# Patient Record
Sex: FEMALE | Race: BLACK OR AFRICAN AMERICAN | NOT HISPANIC OR LATINO | Employment: OTHER | URBAN - METROPOLITAN AREA
[De-identification: names, ages, dates, MRNs, and addresses within clinical notes are randomized per-mention and may not be internally consistent; named-entity substitution may affect disease eponyms.]

---

## 2017-01-03 ENCOUNTER — APPOINTMENT (OUTPATIENT)
Dept: PHYSICAL THERAPY | Facility: CLINIC | Age: 72
End: 2017-01-03
Payer: COMMERCIAL

## 2017-01-03 PROCEDURE — 97140 MANUAL THERAPY 1/> REGIONS: CPT

## 2017-01-03 PROCEDURE — 97110 THERAPEUTIC EXERCISES: CPT

## 2017-01-03 PROCEDURE — 97112 NEUROMUSCULAR REEDUCATION: CPT

## 2017-01-05 ENCOUNTER — APPOINTMENT (OUTPATIENT)
Dept: PHYSICAL THERAPY | Facility: CLINIC | Age: 72
End: 2017-01-05
Payer: COMMERCIAL

## 2017-01-05 PROCEDURE — 97140 MANUAL THERAPY 1/> REGIONS: CPT

## 2017-01-05 PROCEDURE — 97110 THERAPEUTIC EXERCISES: CPT

## 2017-01-06 ENCOUNTER — APPOINTMENT (OUTPATIENT)
Dept: PHYSICAL THERAPY | Facility: CLINIC | Age: 72
End: 2017-01-06
Payer: COMMERCIAL

## 2017-01-06 PROCEDURE — 97112 NEUROMUSCULAR REEDUCATION: CPT

## 2017-01-06 PROCEDURE — 97140 MANUAL THERAPY 1/> REGIONS: CPT

## 2017-01-06 PROCEDURE — 97110 THERAPEUTIC EXERCISES: CPT

## 2017-01-09 ENCOUNTER — APPOINTMENT (OUTPATIENT)
Dept: PHYSICAL THERAPY | Facility: CLINIC | Age: 72
End: 2017-01-09
Payer: COMMERCIAL

## 2017-01-10 ENCOUNTER — APPOINTMENT (OUTPATIENT)
Dept: PHYSICAL THERAPY | Facility: CLINIC | Age: 72
End: 2017-01-10
Payer: COMMERCIAL

## 2017-01-10 PROCEDURE — 97112 NEUROMUSCULAR REEDUCATION: CPT

## 2017-01-10 PROCEDURE — 97140 MANUAL THERAPY 1/> REGIONS: CPT

## 2017-01-11 ENCOUNTER — APPOINTMENT (OUTPATIENT)
Dept: PHYSICAL THERAPY | Facility: CLINIC | Age: 72
End: 2017-01-11
Payer: COMMERCIAL

## 2017-01-12 ENCOUNTER — APPOINTMENT (OUTPATIENT)
Dept: PHYSICAL THERAPY | Facility: CLINIC | Age: 72
End: 2017-01-12
Payer: COMMERCIAL

## 2017-01-12 PROCEDURE — 97110 THERAPEUTIC EXERCISES: CPT

## 2017-01-12 PROCEDURE — 97140 MANUAL THERAPY 1/> REGIONS: CPT

## 2017-01-16 ENCOUNTER — APPOINTMENT (OUTPATIENT)
Dept: PHYSICAL THERAPY | Facility: CLINIC | Age: 72
End: 2017-01-16
Payer: COMMERCIAL

## 2017-01-18 ENCOUNTER — APPOINTMENT (OUTPATIENT)
Dept: PHYSICAL THERAPY | Facility: CLINIC | Age: 72
End: 2017-01-18
Payer: COMMERCIAL

## 2017-01-23 ENCOUNTER — APPOINTMENT (OUTPATIENT)
Dept: PHYSICAL THERAPY | Facility: CLINIC | Age: 72
End: 2017-01-23
Payer: COMMERCIAL

## 2017-01-25 ENCOUNTER — APPOINTMENT (OUTPATIENT)
Dept: PHYSICAL THERAPY | Facility: CLINIC | Age: 72
End: 2017-01-25
Payer: COMMERCIAL

## 2017-01-26 ENCOUNTER — APPOINTMENT (OUTPATIENT)
Dept: PHYSICAL THERAPY | Facility: CLINIC | Age: 72
End: 2017-01-26
Payer: COMMERCIAL

## 2017-01-26 PROCEDURE — 97140 MANUAL THERAPY 1/> REGIONS: CPT

## 2017-01-26 PROCEDURE — 97110 THERAPEUTIC EXERCISES: CPT

## 2017-01-26 PROCEDURE — 97112 NEUROMUSCULAR REEDUCATION: CPT

## 2017-01-30 ENCOUNTER — APPOINTMENT (OUTPATIENT)
Dept: PHYSICAL THERAPY | Facility: CLINIC | Age: 72
End: 2017-01-30
Payer: COMMERCIAL

## 2017-02-01 ENCOUNTER — APPOINTMENT (OUTPATIENT)
Dept: PHYSICAL THERAPY | Facility: CLINIC | Age: 72
End: 2017-02-01
Payer: COMMERCIAL

## 2017-02-08 ENCOUNTER — APPOINTMENT (OUTPATIENT)
Dept: PHYSICAL THERAPY | Facility: CLINIC | Age: 72
End: 2017-02-08
Payer: COMMERCIAL

## 2017-02-08 ENCOUNTER — GENERIC CONVERSION - ENCOUNTER (OUTPATIENT)
Dept: OTHER | Facility: OTHER | Age: 72
End: 2017-02-08

## 2017-02-08 PROCEDURE — G8990 OTHER PT/OT CURRENT STATUS: HCPCS

## 2017-02-08 PROCEDURE — G8991 OTHER PT/OT GOAL STATUS: HCPCS

## 2017-02-08 PROCEDURE — 97110 THERAPEUTIC EXERCISES: CPT

## 2017-02-08 PROCEDURE — 97112 NEUROMUSCULAR REEDUCATION: CPT

## 2017-02-13 ENCOUNTER — APPOINTMENT (OUTPATIENT)
Dept: PHYSICAL THERAPY | Facility: CLINIC | Age: 72
End: 2017-02-13
Payer: COMMERCIAL

## 2017-02-15 ENCOUNTER — APPOINTMENT (OUTPATIENT)
Dept: PHYSICAL THERAPY | Facility: CLINIC | Age: 72
End: 2017-02-15
Payer: COMMERCIAL

## 2017-02-15 PROCEDURE — 97110 THERAPEUTIC EXERCISES: CPT

## 2017-02-22 ENCOUNTER — APPOINTMENT (OUTPATIENT)
Dept: PHYSICAL THERAPY | Facility: CLINIC | Age: 72
End: 2017-02-22
Payer: COMMERCIAL

## 2017-02-28 ENCOUNTER — ALLSCRIPTS OFFICE VISIT (OUTPATIENT)
Dept: OTHER | Facility: OTHER | Age: 72
End: 2017-02-28

## 2017-02-28 DIAGNOSIS — Z12.39 ENCOUNTER FOR OTHER SCREENING FOR MALIGNANT NEOPLASM OF BREAST: ICD-10-CM

## 2017-03-08 ENCOUNTER — APPOINTMENT (OUTPATIENT)
Dept: PHYSICAL THERAPY | Facility: CLINIC | Age: 72
End: 2017-03-08
Payer: COMMERCIAL

## 2017-03-08 PROCEDURE — 97110 THERAPEUTIC EXERCISES: CPT

## 2017-03-08 PROCEDURE — 97112 NEUROMUSCULAR REEDUCATION: CPT

## 2017-03-08 PROCEDURE — 97140 MANUAL THERAPY 1/> REGIONS: CPT

## 2017-03-16 ENCOUNTER — APPOINTMENT (OUTPATIENT)
Dept: PHYSICAL THERAPY | Facility: CLINIC | Age: 72
End: 2017-03-16
Payer: COMMERCIAL

## 2017-03-17 ENCOUNTER — APPOINTMENT (OUTPATIENT)
Dept: PHYSICAL THERAPY | Facility: CLINIC | Age: 72
End: 2017-03-17
Payer: COMMERCIAL

## 2017-03-17 ENCOUNTER — GENERIC CONVERSION - ENCOUNTER (OUTPATIENT)
Dept: OTHER | Facility: OTHER | Age: 72
End: 2017-03-17

## 2017-03-17 PROCEDURE — G8991 OTHER PT/OT GOAL STATUS: HCPCS

## 2017-03-17 PROCEDURE — 97110 THERAPEUTIC EXERCISES: CPT

## 2017-04-10 ENCOUNTER — ALLSCRIPTS OFFICE VISIT (OUTPATIENT)
Dept: OTHER | Facility: OTHER | Age: 72
End: 2017-04-10

## 2017-04-25 ENCOUNTER — ALLSCRIPTS OFFICE VISIT (OUTPATIENT)
Dept: OTHER | Facility: OTHER | Age: 72
End: 2017-04-25

## 2017-04-25 DIAGNOSIS — Z78.0 ASYMPTOMATIC MENOPAUSAL STATE: ICD-10-CM

## 2017-04-25 DIAGNOSIS — Z12.39 ENCOUNTER FOR OTHER SCREENING FOR MALIGNANT NEOPLASM OF BREAST: ICD-10-CM

## 2017-04-25 DIAGNOSIS — N95.1 FEMALE CLIMACTERIC STATE: ICD-10-CM

## 2017-04-28 ENCOUNTER — HOSPITAL ENCOUNTER (OUTPATIENT)
Dept: MAMMOGRAPHY | Facility: HOSPITAL | Age: 72
Discharge: HOME | End: 2017-04-28
Attending: OBSTETRICS & GYNECOLOGY
Payer: COMMERCIAL

## 2017-04-28 DIAGNOSIS — Z12.39 ENCOUNTER FOR OTHER SCREENING FOR MALIGNANT NEOPLASM OF BREAST: ICD-10-CM

## 2017-04-28 PROCEDURE — G0202 SCR MAMMO BI INCL CAD: HCPCS

## 2017-05-03 ENCOUNTER — HOSPITAL ENCOUNTER (OUTPATIENT)
Dept: RADIOLOGY | Age: 72
Discharge: HOME | End: 2017-05-03
Payer: COMMERCIAL

## 2017-05-03 DIAGNOSIS — N95.1 FEMALE CLIMACTERIC STATE: ICD-10-CM

## 2017-05-03 DIAGNOSIS — Z78.0 ASYMPTOMATIC MENOPAUSAL STATE: ICD-10-CM

## 2017-05-03 PROCEDURE — 77080 DXA BONE DENSITY AXIAL: CPT

## 2017-05-05 ENCOUNTER — GENERIC CONVERSION - ENCOUNTER (OUTPATIENT)
Dept: OTHER | Facility: OTHER | Age: 72
End: 2017-05-05

## 2017-06-27 ENCOUNTER — ALLSCRIPTS OFFICE VISIT (OUTPATIENT)
Dept: OTHER | Facility: OTHER | Age: 72
End: 2017-06-27

## 2017-06-27 DIAGNOSIS — M54.6 PAIN IN THORACIC SPINE: ICD-10-CM

## 2017-06-27 DIAGNOSIS — I10 ESSENTIAL (PRIMARY) HYPERTENSION: ICD-10-CM

## 2017-06-27 DIAGNOSIS — R60.9 EDEMA: ICD-10-CM

## 2017-06-29 ENCOUNTER — GENERIC CONVERSION - ENCOUNTER (OUTPATIENT)
Dept: OTHER | Facility: OTHER | Age: 72
End: 2017-06-29

## 2017-06-29 ENCOUNTER — APPOINTMENT (OUTPATIENT)
Dept: LAB | Facility: CLINIC | Age: 72
End: 2017-06-29
Payer: COMMERCIAL

## 2017-06-29 DIAGNOSIS — I10 ESSENTIAL (PRIMARY) HYPERTENSION: ICD-10-CM

## 2017-06-29 DIAGNOSIS — R60.9 EDEMA: ICD-10-CM

## 2017-06-29 DIAGNOSIS — M54.6 PAIN IN THORACIC SPINE: ICD-10-CM

## 2017-06-29 LAB
ALBUMIN SERPL BCP-MCNC: 4 G/DL (ref 3.5–5)
ALP SERPL-CCNC: 100 U/L (ref 46–116)
ALT SERPL W P-5'-P-CCNC: 24 U/L (ref 12–78)
ANION GAP SERPL CALCULATED.3IONS-SCNC: 7 MMOL/L (ref 4–13)
AST SERPL W P-5'-P-CCNC: 17 U/L (ref 5–45)
BILIRUB SERPL-MCNC: 0.42 MG/DL (ref 0.2–1)
BUN SERPL-MCNC: 19 MG/DL (ref 5–25)
CALCIUM SERPL-MCNC: 9.3 MG/DL (ref 8.3–10.1)
CHLORIDE SERPL-SCNC: 104 MMOL/L (ref 100–108)
CHOLEST SERPL-MCNC: 197 MG/DL (ref 50–200)
CO2 SERPL-SCNC: 29 MMOL/L (ref 21–32)
CREAT SERPL-MCNC: 0.75 MG/DL (ref 0.6–1.3)
ERYTHROCYTE [DISTWIDTH] IN BLOOD BY AUTOMATED COUNT: 14.9 % (ref 11.6–15.1)
GFR SERPL CREATININE-BSD FRML MDRD: >60 ML/MIN/1.73SQ M
GLUCOSE P FAST SERPL-MCNC: 104 MG/DL (ref 65–99)
HCT VFR BLD AUTO: 39 % (ref 34.8–46.1)
HDLC SERPL-MCNC: 62 MG/DL (ref 40–60)
HGB BLD-MCNC: 12.6 G/DL (ref 11.5–15.4)
LDLC SERPL CALC-MCNC: 113 MG/DL (ref 0–100)
MCH RBC QN AUTO: 28.1 PG (ref 26.8–34.3)
MCHC RBC AUTO-ENTMCNC: 32.3 G/DL (ref 31.4–37.4)
MCV RBC AUTO: 87 FL (ref 82–98)
PLATELET # BLD AUTO: 227 THOUSANDS/UL (ref 149–390)
PMV BLD AUTO: 10.3 FL (ref 8.9–12.7)
POTASSIUM SERPL-SCNC: 3.5 MMOL/L (ref 3.5–5.3)
PROT SERPL-MCNC: 7.6 G/DL (ref 6.4–8.2)
RBC # BLD AUTO: 4.48 MILLION/UL (ref 3.81–5.12)
SODIUM SERPL-SCNC: 140 MMOL/L (ref 136–145)
TRIGL SERPL-MCNC: 112 MG/DL
TSH SERPL DL<=0.05 MIU/L-ACNC: 1.35 UIU/ML (ref 0.36–3.74)
WBC # BLD AUTO: 4.46 THOUSAND/UL (ref 4.31–10.16)

## 2017-06-29 PROCEDURE — 84443 ASSAY THYROID STIM HORMONE: CPT

## 2017-06-29 PROCEDURE — 85027 COMPLETE CBC AUTOMATED: CPT

## 2017-06-29 PROCEDURE — 80053 COMPREHEN METABOLIC PANEL: CPT

## 2017-06-29 PROCEDURE — 80061 LIPID PANEL: CPT

## 2017-06-30 ENCOUNTER — GENERIC CONVERSION - ENCOUNTER (OUTPATIENT)
Dept: OTHER | Facility: OTHER | Age: 72
End: 2017-06-30

## 2017-07-28 ENCOUNTER — TRANSCRIBE ORDERS (OUTPATIENT)
Dept: ADMINISTRATIVE | Facility: HOSPITAL | Age: 72
End: 2017-07-28

## 2017-07-28 DIAGNOSIS — M25.473 SWELLING OF ANKLE JOINT, UNSPECIFIED LATERALITY: Primary | ICD-10-CM

## 2017-07-31 ENCOUNTER — GENERIC CONVERSION - ENCOUNTER (OUTPATIENT)
Dept: OTHER | Facility: OTHER | Age: 72
End: 2017-07-31

## 2017-07-31 ENCOUNTER — HOSPITAL ENCOUNTER (OUTPATIENT)
Dept: NON INVASIVE DIAGNOSTICS | Facility: CLINIC | Age: 72
Discharge: HOME | End: 2017-07-31
Payer: COMMERCIAL

## 2017-07-31 DIAGNOSIS — M25.473 SWELLING OF ANKLE JOINT, UNSPECIFIED LATERALITY: ICD-10-CM

## 2017-07-31 PROCEDURE — 93306 TTE W/DOPPLER COMPLETE: CPT

## 2017-08-08 ENCOUNTER — HOSPITAL ENCOUNTER (OUTPATIENT)
Dept: NON INVASIVE DIAGNOSTICS | Facility: CLINIC | Age: 72
Discharge: HOME | End: 2017-08-08
Payer: COMMERCIAL

## 2017-09-14 ENCOUNTER — GENERIC CONVERSION - ENCOUNTER (OUTPATIENT)
Dept: OTHER | Facility: OTHER | Age: 72
End: 2017-09-14

## 2017-09-14 DIAGNOSIS — M25.561 PAIN IN RIGHT KNEE: ICD-10-CM

## 2017-11-26 ENCOUNTER — HOSPITAL ENCOUNTER (EMERGENCY)
Facility: HOSPITAL | Age: 72
Discharge: HOME | End: 2017-11-26
Attending: EMERGENCY MEDICINE
Payer: COMMERCIAL

## 2017-11-26 VITALS
OXYGEN SATURATION: 100 % | HEIGHT: 61 IN | HEART RATE: 59 BPM | DIASTOLIC BLOOD PRESSURE: 107 MMHG | TEMPERATURE: 98.1 F | WEIGHT: 187.17 LBS | RESPIRATION RATE: 18 BRPM | SYSTOLIC BLOOD PRESSURE: 178 MMHG | BODY MASS INDEX: 35.34 KG/M2

## 2017-11-26 DIAGNOSIS — M54.31 SCIATICA OF RIGHT SIDE: Primary | ICD-10-CM

## 2017-11-26 PROCEDURE — 96372 THER/PROPH/DIAG INJ SC/IM: CPT

## 2017-11-26 PROCEDURE — 99283 EMERGENCY DEPT VISIT LOW MDM: CPT

## 2017-11-26 RX ORDER — DEXAMETHASONE SODIUM PHOSPHATE 10 MG/ML
10 INJECTION, SOLUTION INTRAMUSCULAR; INTRAVENOUS ONCE
Status: COMPLETED | OUTPATIENT
Start: 2017-11-26 | End: 2017-11-26

## 2017-11-26 RX ORDER — METHYLPREDNISOLONE 4 MG/1
TABLET ORAL
Qty: 21 TABLET | Refills: 0 | Status: SHIPPED | OUTPATIENT
Start: 2017-11-27 | End: 2020-08-24 | Stop reason: ALTCHOICE

## 2017-11-26 RX ADMIN — DEXAMETHASONE SODIUM PHOSPHATE 10 MG: 10 INJECTION, SOLUTION INTRAMUSCULAR; INTRAVENOUS at 22:37

## 2017-11-27 NOTE — ED PROVIDER NOTES
History  Chief Complaint   Patient presents with    Leg Pain     Pt presents to ED for evaluation and treatment of right lateral leg/hip pain worsenng over past 3 days  No known injury     Patient is a 67year old female with several days of worsening right buttock pain with radiation down to right lower leg  No trauma  No fever  No weakness or numbness  No urinary sx or incontinence  No weight loss  Tried ibuprofen and aleve without relief  Does not want narcotic pain medication  Was last seen in this ED on 7/14/16 for shoulder dislocation  [facility] website checked on this patient and last Rx filled was on 3/30/17 for vicodin  Has appointment with Dr Damien Hernandez on 12/5/17  History provided by:  Patient and relative (daughter)   used: No    Leg Pain   Associated symptoms: back pain    Associated symptoms: no fever        Prior to Admission Medications   Prescriptions Last Dose Informant Patient Reported? Taking?   hydrochlorothiazide (HYDRODIURIL) 25 mg tablet   Yes No   Sig: Take 25 mg by mouth daily  lisinopril (ZESTRIL) 10 mg tablet   Yes No   Sig: Take 10 mg by mouth daily  metoprolol succinate (TOPROL-XL) 50 mg 24 hr tablet   Yes No   Sig: Take 50 mg by mouth 2 (two) times a day  naproxen sodium (ANAPROX) 550 mg tablet   No No   Sig: Take 1 tablet (550 mg total) by mouth 2 (two) times a day with meals        Facility-Administered Medications: None       Past Medical History:   Diagnosis Date    Edema     B/L LE    Heart murmur     Hypertension     Lower leg pain     left    PONV (postoperative nausea and vomiting)     Rotator cuff tear     left    Seasonal allergies     Shoulder dislocation     Right x5    Varicose vein of leg     B/L       Past Surgical History:   Procedure Laterality Date    BREAST BIOPSY      COLONOSCOPY      KNEE ARTHROSCOPY Left     NO PAST SURGERIES      [place] SHLDR ARTHROSCOP,SURG,W/ROTAT CUFF REPR Right 8/24/2016    Procedure: SHOULDER ARTHROSCOPY WITH REPAIR ROTATOR CUFF  ;  Surgeon: Yojana Rascon MD;  [facility];  Service: Orthopedics    TUBAL LIGATION         No family history on file  I have reviewed and agree with the history as documented  Social History   Substance Use Topics    Smoking status: Never Smoker    Smokeless tobacco: Never Used    Alcohol use Yes      Comment: socially        Review of Systems   Constitutional: Negative for fever and unexpected weight change  Genitourinary: Negative for difficulty urinating  Musculoskeletal: Positive for arthralgias and back pain  Neurological: Negative for weakness and numbness  All other systems reviewed and are negative  Physical Exam  ED Triage Vitals [11/26/17 2106]   Temperature Pulse Respirations Blood Pressure SpO2   98 1 °F (36 7 °C) 59 18 (!) 178/107 100 %      Temp Source Heart Rate Source Patient Position - Orthostatic VS BP Location FiO2 (%)   Oral Monitor Lying Right arm --      Pain Score       9           Orthostatic Vital Signs  Vitals:    11/26/17 2106   BP: (!) 178/107   Pulse: 59   Patient Position - Orthostatic VS: Lying       Physical Exam   Constitutional: She is oriented to person, place, and time  She appears well-developed and well-nourished  She appears distressed (moderate)  HENT:   Head: Normocephalic and atraumatic  Moist mucous membranes  Eyes: No scleral icterus  Neck: No JVD present  Cardiovascular: Regular rhythm and normal heart sounds  No murmur heard  Bradycardia  Pulmonary/Chest: Effort normal and breath sounds normal  No stridor  No respiratory distress  Abdominal: Soft  Bowel sounds are normal  There is no tenderness  Musculoskeletal: She exhibits tenderness (R gluteal tenderness with 30 degree straight leg raise  NVI  )  She exhibits no edema or deformity  Neurological: She is alert and oriented to person, place, and time  No focal deficits  Skin: Skin is warm and dry  No rash noted  Psychiatric: She has a normal mood and affect  Nursing note and vitals reviewed  ED Medications  Medications   dexamethasone (PF) (DECADRON) injection 10 mg (not administered)       Diagnostic Studies  Results Reviewed     None                 No orders to display              Procedures  Procedures       Phone Contacts  ED Phone Contact    ED Course  ED Course                                MDM  Number of Diagnoses or Management Options  Diagnosis management comments: DDx including but not limited to: sciatica, herniated disc, arthritis, spinal stenosis, strain, sprain; doubt fracture, cauda equina syndrome, epidural abscess, AAA  Amount and/or Complexity of Data Reviewed  Decide to obtain previous medical records or to obtain history from someone other than the patient: yes  Obtain history from someone other than the patient: yes  Review and summarize past medical records: yes      CritCare Time    Disposition  Final diagnoses:   Sciatica of right side     Time reflects when diagnosis was documented in both MDM as applicable and the Disposition within this note     Time User Action Codes Description Comment    11/26/2017 10:21 PM Erlinda Bland Add [M54 31] Sciatica of right side       ED Disposition     ED Disposition Condition Comment    Discharge  Ab Po discharge to home/self care  Condition at discharge: Stable        Follow-up Information     Follow up With Specialties Details Why [facility]  Call in 3 days Continue ibuprofen or aleve for pain  Return sooner if increased pain, fever, rash, vomiting, weakness, numbness, incontinence, difficulty urinating    [address]  [phone]        Patient's Medications   Discharge Prescriptions    METHYLPREDNISOLONE 4 MG TBPK    Use as directed on package       Start Date: 11/27/2017End Date: --       Order Dose: --       Quantity: 21 tablet Refills: 0     No discharge procedures on file      ED Provider  Electronically Signed by           John Ramsey MD  11/26/17 3119

## 2017-11-27 NOTE — DISCHARGE INSTRUCTIONS
Sciatica   WHAT YOU NEED TO KNOW:   Sciatica is a condition that causes pain along your sciatic nerve  The sciatic nerve runs from your spine through both sides of your buttocks  It then runs down the back of your thigh, into your lower leg and foot  Your sciatic nerve may be compressed, inflamed, irritated, or stretched  DISCHARGE INSTRUCTIONS:   Medicines:   · NSAIDs:  These medicines decrease swelling and pain  NSAIDs are available without a doctor's order  Ask your healthcare provider which medicine is right for you  Ask how much to take and when to take it  Take as directed  NSAIDs can cause stomach bleeding or kidney problems if not taken correctly  · Acetaminophen: This medicine decreases pain  Acetaminophen is available without a doctor's order  Ask how much to take and when to take it  Follow directions  Acetaminophen can cause liver damage if not taken correctly  · Muscle relaxers  help decrease pain and muscle spasms  · Take your medicine as directed  Contact your healthcare provider if you think your medicine is not helping or if you have side effects  Tell him of her if you are allergic to any medicine  Keep a list of the medicines, vitamins, and herbs you take  Include the amounts, and when and why you take them  Bring the list or the pill bottles to follow-up visits  Carry your medicine list with you in case of an emergency  Follow up with your healthcare provider as directed:  Write down your questions so you remember to ask them during your visits  Manage your symptoms:   · Activity:  Decrease your activity  Do not lift heavy objects or twist your back for at least 6 weeks  Slowly return to your usual activity  · Ice:  Ice helps decrease swelling and pain  Ice may also help prevent tissue damage  Use an ice pack, or put crushed ice in a plastic bag  Cover it with a towel and place it on your low back or leg for 15 to 20 minutes every hour or as directed      · Heat:  Heat helps decrease pain and muscle spasms  Apply heat on the area for 20 to 30 minutes every 2 hours for as many days as directed  · Physical therapy:  You may need to see physical therapist to teach you exercises to help improve movement and strength, and to decrease pain  An occupational therapist teaches you skills to help with your daily activities  · Use assistive devices if directed: You may need to wear back support, such as a back brace  You may need crutches, a cane, or a walker to decrease stress on your lower back and leg muscles  Ask your healthcare provider for more information about assistive devices and how to use them correctly  Self-care:   · Avoid pressure on your back and legs:  Do not  lift heavy objects, or stand or sit for long periods of time  · Lift objects safely:  Keep your back straight and bend your knees when you  an object  Do not bend or twist your back when you lift  · Maintain a healthy weight:  Ask your healthcare provider how much you should weigh  Ask him to help you create a weight loss plan if you are overweight  · Exercise:  Ask your healthcare provider about the best stretching, warmup, and exercise plan for you  Contact your healthcare provider if:   · You have pain in your lower back at night or when resting  · You have pain in your lower back with numbness below the knee  · You have weakness in one leg only  · You have questions or concerns about your condition or care  Return to the emergency department if:   · You have trouble holding back your urine or bowel movements  · You have weakness in both legs  · You have numbness in your groin or buttocks  © 2017 [address] [employer] Information is for End User's use only and may not be sold, redistributed or otherwise used for commercial purposes  All illustrations and images included in CareNotes® are the copyrighted property of [employer]  or Saul Malin    The above information is an  only  It is not intended as medical advice for individual conditions or treatments  Talk to your doctor, nurse or pharmacist before following any medical regimen to see if it is safe and effective for you

## 2017-11-27 NOTE — ED NOTES
Pt  Transported out of dept  In wheelchair by family member, Mollie Saint, no acute distress       Kenneth Levine RN  11/26/17 3566

## 2017-11-29 ENCOUNTER — GENERIC CONVERSION - ENCOUNTER (OUTPATIENT)
Dept: OTHER | Facility: OTHER | Age: 72
End: 2017-11-29

## 2017-11-29 DIAGNOSIS — I10 ESSENTIAL (PRIMARY) HYPERTENSION: ICD-10-CM

## 2017-11-29 DIAGNOSIS — R73.01 IMPAIRED FASTING GLUCOSE: ICD-10-CM

## 2017-11-29 DIAGNOSIS — R60.9 EDEMA: ICD-10-CM

## 2017-12-01 ENCOUNTER — APPOINTMENT (OUTPATIENT)
Dept: LAB | Facility: CLINIC | Age: 72
End: 2017-12-01
Payer: COMMERCIAL

## 2017-12-01 DIAGNOSIS — R60.9 EDEMA: ICD-10-CM

## 2017-12-01 DIAGNOSIS — R73.01 IMPAIRED FASTING GLUCOSE: ICD-10-CM

## 2017-12-01 DIAGNOSIS — I10 ESSENTIAL (PRIMARY) HYPERTENSION: ICD-10-CM

## 2017-12-01 LAB
ALBUMIN SERPL BCP-MCNC: 3.7 G/DL (ref 3.5–5)
ALP SERPL-CCNC: 104 U/L (ref 46–116)
ALT SERPL W P-5'-P-CCNC: 21 U/L (ref 12–78)
ANION GAP SERPL CALCULATED.3IONS-SCNC: 5 MMOL/L (ref 4–13)
AST SERPL W P-5'-P-CCNC: 10 U/L (ref 5–45)
BILIRUB SERPL-MCNC: 0.44 MG/DL (ref 0.2–1)
BUN SERPL-MCNC: 21 MG/DL (ref 5–25)
CALCIUM SERPL-MCNC: 9.4 MG/DL (ref 8.3–10.1)
CHLORIDE SERPL-SCNC: 102 MMOL/L (ref 100–108)
CHOLEST SERPL-MCNC: 222 MG/DL (ref 50–200)
CO2 SERPL-SCNC: 29 MMOL/L (ref 21–32)
CREAT SERPL-MCNC: 0.86 MG/DL (ref 0.6–1.3)
ERYTHROCYTE [DISTWIDTH] IN BLOOD BY AUTOMATED COUNT: 14.3 % (ref 11.6–15.1)
EST. AVERAGE GLUCOSE BLD GHB EST-MCNC: 131 MG/DL
GFR SERPL CREATININE-BSD FRML MDRD: 78 ML/MIN/1.73SQ M
GLUCOSE P FAST SERPL-MCNC: 105 MG/DL (ref 65–99)
HBA1C MFR BLD: 6.2 % (ref 4.2–6.3)
HCT VFR BLD AUTO: 41.3 % (ref 34.8–46.1)
HDLC SERPL-MCNC: 78 MG/DL (ref 40–60)
HGB BLD-MCNC: 13.7 G/DL (ref 11.5–15.4)
LDLC SERPL CALC-MCNC: 128 MG/DL (ref 0–100)
MCH RBC QN AUTO: 27.8 PG (ref 26.8–34.3)
MCHC RBC AUTO-ENTMCNC: 33.2 G/DL (ref 31.4–37.4)
MCV RBC AUTO: 84 FL (ref 82–98)
PLATELET # BLD AUTO: 255 THOUSANDS/UL (ref 149–390)
PMV BLD AUTO: 10.9 FL (ref 8.9–12.7)
POTASSIUM SERPL-SCNC: 3.6 MMOL/L (ref 3.5–5.3)
PROT SERPL-MCNC: 7.8 G/DL (ref 6.4–8.2)
RBC # BLD AUTO: 4.93 MILLION/UL (ref 3.81–5.12)
SODIUM SERPL-SCNC: 136 MMOL/L (ref 136–145)
TRIGL SERPL-MCNC: 80 MG/DL
TSH SERPL DL<=0.05 MIU/L-ACNC: 0.74 UIU/ML (ref 0.36–3.74)
WBC # BLD AUTO: 6.56 THOUSAND/UL (ref 4.31–10.16)

## 2017-12-01 PROCEDURE — 85027 COMPLETE CBC AUTOMATED: CPT

## 2017-12-01 PROCEDURE — 83036 HEMOGLOBIN GLYCOSYLATED A1C: CPT

## 2017-12-01 PROCEDURE — 36415 COLL VENOUS BLD VENIPUNCTURE: CPT

## 2017-12-01 PROCEDURE — 80053 COMPREHEN METABOLIC PANEL: CPT

## 2017-12-01 PROCEDURE — 84443 ASSAY THYROID STIM HORMONE: CPT

## 2017-12-01 PROCEDURE — 80061 LIPID PANEL: CPT

## 2017-12-04 ENCOUNTER — GENERIC CONVERSION - ENCOUNTER (OUTPATIENT)
Dept: OTHER | Facility: OTHER | Age: 72
End: 2017-12-04

## 2017-12-05 ENCOUNTER — GENERIC CONVERSION - ENCOUNTER (OUTPATIENT)
Dept: OTHER | Facility: OTHER | Age: 72
End: 2017-12-05

## 2017-12-05 ENCOUNTER — ALLSCRIPTS OFFICE VISIT (OUTPATIENT)
Dept: OTHER | Facility: OTHER | Age: 72
End: 2017-12-05

## 2017-12-06 NOTE — PROGRESS NOTES
Assessment    1  Right knee pain (152 46) (M26 918)    Plan  Right knee pain    · MethylPREDNISolone Acetate 40 MG/ML Injection Suspension(DEPO-Medrol)    Discussion/Summary    Right knee pain, likely DJD  cane and activity modification as needed  injection performed today  lateral  brace from [employer]  Follow-up as needed if pain persists  Chief Complaint    1  Knee Pain    History of Present Illness  HPI: 59-year-old female presents with right knee pain for several years gradually worsening  She has associated swelling  Symptoms are worse with activity  She also notes stiffness at times  She uses a cane for ambulation  She was recently taking prednisone which helped as well  past medical history, past surgical history, medications, allergies, social history, and family history were reviewed and updated today  Review of Systems   Constitutional: No fever, no chills, feels well, no tiredness, no recent weight gain or loss  Eyes: No complaints of eyesight problems, no red eyes  ENT: no loss of hearing, no nosebleeds, no sore throat  Cardiovascular: No complaints of chest pain, no palpitations, no leg claudication or lower extremity edema  Respiratory: no compliants of shortness of breath, no wheezing, no cough  Gastrointestinal: no complaints of abdominal pain, no constipation, no nausea or diarrhea, no vomiting, no bloody stools  Genitourinary: no complaints of dysuria, no incontinence  Musculoskeletal: as noted in HPI  Integumentary: no complaints of skin rash or lesion, no itching or dry skin, no skin wounds  Neurological: no complaints of headache, no confusion, no numbness or tingling, no dizziness  Endocrine: No complaints of muscle weakness, no feelings of weakness, no frequent urination, no excessive thirst   Psychiatric: No suicidal thoughts, no anxiety, no feelings of depression  Active Problems  1  Benign essential hypertension (401 1) (I10)   2  Edema (782 3) (R60 9)   3  Impaired fasting glucose (790 21) (R73 01)   4  Lower back pain (724 2) (M54 5)   5  Murmur (785 2) (R01 1)   6  Need for influenza vaccination (V04 81) (Z23)   7  Neuritis (729 2) (M79 2)   8  OAB (overactive bladder) (596 51) (N32 81)   9  Right knee pain (719 46) (M25 561)   10  History of Right knee pain (719 46) (M25 561)   11  Varicose veins of legs (454 9) (I83 93)    Past Medical History     · History of Asymptomatic menopause (V49 81) (Z78 0)   · History of Encounter for screening mammogram for malignant neoplasm of breast(V76 12) (Z12 31)   · History of acute sinusitis (V12 69) (Z87 09)   · History of Left-sided thoracic back pain (724 1) (M54 6)   · History of Leg pain, left (729 5) (M79 605)   · History of Recurrent dislocation of right shoulder (718 31) (M24 411)   · History of Right knee pain (719 46) (M25 561)   · History of Tear of right rotator cuff (840 4) (M75 101)    Surgical History   · History of Arthroscopy Knee Left   · History of Complete Colonoscopy   · History of Incisional Breast Biopsy   · History of Tubal Ligation    Family History  Mother    · Family history of Stroke Syndrome (V17 1)  Father    · Family history of Cancer  Son    · Family history of Hypertension (V17 49)  Sister    · Family history of Colon Cancer (V16 0)   · Family history of Hypertension (V17 49)  Brother    · Family history of Hypertension (V17 49)  Family History    · Family history of Uterine cancer (80) (C55)    Social History     · Being A Social Drinker   · Coffee   · Educational Level   · HS grad   · Marital History -    · 5 children   · Never A Smoker   · Occupation:   · caregiver   · Tea    Current Meds   1  Detrol LA 4 MG Oral Capsule Extended Release 24 Hour (Tolterodine Tartrate ER); take 1 capsule daily; Therapy: 76Bte7579 to (Evaluate:24Jun2017)  Requested for: 25Apr2017; Last Rx:25Apr2017 Ordered   2   Furosemide 20 MG Oral Tablet; 1-2 tabs by mouth daily as needed for swelling (do not take the Hydrochlorothiazde on those days); Therapy: 86VXA7534 to (Last Rx:27Jun2017)  Requested for: 27Jun2017 Ordered   3  HydroCHLOROthiazide 25 MG Oral Tablet; take 1 tablet by mouth daily; Therapy: 67SLT4996 to (Algjason Smaller)  Requested for: 33QQZ9276; Last Rx:27Nov2017 Ordered   4  Ibuprofen 800 MG Oral Tablet; TAKE 1 TABLET 3 TIMES DAILY WITH FOOD AS NEEDED; Therapy: 63Zyk3110 to (Miesha Peto)  Requested for: 57Ucq9830; Last Rx:86Qkl4783 Ordered   5  Jobst Anti-Em Knee Length Med Miscellaneous; USE AS DIRECTED; Therapy: 76NRW5881 to (Last Rx:11Oct2016)  Requested for: 80XZX1765 Ordered   6  Lisinopril 5 MG Oral Tablet; take 1 tablet by mouth daily as directed; Therapy: 26LQN0192 to (Algjason Smaller)  Requested for: 01XLZ8097; Last Rx:27Nov2017 Ordered   7  Metoprolol Tartrate 50 MG Oral Tablet; take 1 tablet by mouth twice a day; Therapy: 70BWV1777 to (Algjason Smaller)  Requested for: 71MSS3408; Last Rx:27Nov2017 Ordered    Allergies  1  No Known Drug Allergies    Vitals  Signs     Heart Rate: 54  Systolic: 403  Diastolic: 71  Height: 5 ft   Weight: 180 lb   BMI Calculated: 35 15  BSA Calculated: 1 78    Physical Exam  Right knee:  Mild soft tissue swelling anteromedial aspect  Small effusion  No focal joint line tenderness  Range of motion is extension 0Â° and flexion 110Â°   12Â° valgus alignment  Stable with varus and valgus stress  Jay's test is negative  Patellar compression test is negative  No extensor lag  Constitutional - General appearance: Normal    Eyes - Conjunctiva and lids: Normal   Cardiovascular - Pulses: Normal  Lungs - Respiratory effort: Normal  Skin - Skin and subcutaneous tissue: Normal   Neurologic - Sensation: Normal   Psychiatric - Mood and affect: Normal       Procedure    Procedure: Injection of the right knee joint  Verbal consent was obtained prior to the procedure  Alcohol was used to prep the area  ethyl chloride spray was used as a topical anesthetic   A 22-gauge was used to inject 5 mL of 1% Lidocaine-- and-- 1 mL of 40mg/mL methylprednisolone  A bandage was applied  the patient tolerated the procedure well  Complications: none        Future Appointments    Date/Time Provider Specialty Site   02/01/2018 09:00 AM Oliver Cota DO Family Medicine [facility]       Signatures   Electronically signed by : MICHAEL Mckeon ; Dec  5 2017 11:34AM EST                       (Author)

## 2018-01-10 NOTE — RESULT NOTES
Verified Results  * MAMMO SCREENING BILATERAL W CAD [id number] 58:24YR Warholic, Linda Lady     Test Name Result Flag Reference   MAMMO SCREENING BILATERAL W CAD (Report)     Patient History:   Patient is postmenopausal    Family history of unknown cancer in father at age 48 or over and    colorectal cancer in sister at age 43  Patient's BMI is 31 9      Reason for exam: screening (asymptomatic)  Mammo Screening Bilateral W CAD: February 19, 2016 - Check In #:    [de-identified]   Bilateral CC and MLO view(s) were taken  Technologist: FIORELLA Rodríguez (R)(M)   Prior study comparison: August 22, 2014, bilateral digital    screening mammogram performed at [place] [address]  There are scattered fibroglandular densities  The parenchymal pattern appears stable  No dominant soft tissue    mass or suspicious calcifications are noted  The skin and nipple   contours are within normal limits  No mammographic evidence of malignancy  No    significant changes when compared with prior studies  ASSESSMENT: BiRad:1 - Negative     Recommendation:   Routine screening mammogram in 1 year  A reminder letter will be   scheduled  Analyzed by CAD     8-10% of cancers will be missed on mammography  Management of a    palpable abnormality must be based on clinical grounds  Patients   will be notified of their results via letter from our facility  Accredited by [employer] of Radiology and FDA       Transcription Location: [facility] 98: [id number]     Risk Value(s):   Tyrer-Cuzick 10 Year: 1 693%, Tyrer-Cuzick Lifetime: 2 706%,    Myriad Table: 1 5%, SARAH 5 Year: 1 3%, NCI Lifetime: 3 7%   Signed by:   Landon Heath MD   2/19/16

## 2018-01-10 NOTE — MISCELLANEOUS
Provider Comments  Provider Comments:   Patient never showed for her appointment today, nor did she call to cancel or reschedule   [id number] [id number]      Signatures   Electronically signed by : MICHAEL Vela ; Aug 11 2016  3:51PM EST                       (Author)

## 2018-01-12 NOTE — RESULT NOTES
Verified Results  * MRI SHOULDER RIGHT WO CONTRAST [id number] 05:07PM Godrfey Santillan     Test Name Result Flag Reference   MRI SHOULDER RIGHT WO CONTRAST (Report)     This is a summary report  The complete report is available in the patient's medical record  If you cannot access the medical record, please contact the sending organization for a detailed fax or copy  MRI RIGHT SHOULDER     INDICATION: Right shoulder pain  History of recurrent shoulder dislocations since 12/12/2015  COMPARISON: Right shoulder plain films from 1/16/2016  Multiple older plain films dating back to 12/4/2015 were also reviewed  TECHNIQUE:  The following MR sequences were obtained of the right shoulder: Localizer, axial GRE/PD fat sat, oblique coronal T2 fat sat, oblique sagittal T1/T2 fat sat  Images were acquired on a 1 5 Cora unit  Gadolinium was not used  FINDINGS:     SUBCUTANEOUS TISSUES: Normal     JOINT EFFUSION: None  ACROMION PROCESS: There is an os acromiale  (Series 5 image 3 )     ROTATOR CUFF: There are complete tears of the supraspinatus and infraspinatus, both with torn tendon edges retracted to the level of the humeral head apex and associated with moderate muscle atrophy  The subscapularis and teres minor are intact  SUBACROMIAL/SUBDELTOID BURSA: Normal       LONG HEAD OF BICEPS TENDON: There is high-grade partial tear of the long head of the biceps tendon as evidenced by severely attenuated caliber of the tendon  GLENOID LABRUM: Intact  Specifically, there is no evidence of os anterior inferior glenohumeral labral tear to suggest a Bankart lesion associated with anterior glenohumeral joint dislocation  GLENOHUMERAL JOINT: Intact  ACROMIOCLAVICULAR JOINT: There is mild osteoarthritis       BONE MARROW SIGNAL: There is a small chronic Hill-Sachs deformity as evidenced by flattening of the posterior superior lateral humeral head (series 5 image 8 )        IMPRESSION:     There is a small chronic Hill-Sachs deformity of the humeral head, without associated Bankart lesion  There are complete tears of the supraspinatus and infraspinatus, both with torn tendon edges retracted to the level of the humeral head apex and associated with moderate muscle atrophy  High-grade partial tear of the long head of the biceps tendon  There is an os acromiale   (Series 5 image 3 )     Signed by:   Jose Avila MD   2/8/16

## 2018-01-13 VITALS
HEART RATE: 61 BPM | HEIGHT: 60 IN | DIASTOLIC BLOOD PRESSURE: 78 MMHG | BODY MASS INDEX: 35.93 KG/M2 | OXYGEN SATURATION: 97 % | RESPIRATION RATE: 18 BRPM | SYSTOLIC BLOOD PRESSURE: 128 MMHG | TEMPERATURE: 97.6 F | WEIGHT: 183 LBS

## 2018-01-13 VITALS
SYSTOLIC BLOOD PRESSURE: 142 MMHG | TEMPERATURE: 98.1 F | HEART RATE: 72 BPM | OXYGEN SATURATION: 98 % | BODY MASS INDEX: 35.54 KG/M2 | WEIGHT: 182 LBS | DIASTOLIC BLOOD PRESSURE: 80 MMHG

## 2018-01-13 NOTE — RESULT NOTES
Verified Results  ECHO COMPLETE WITH CONTRAST IF INDICATED [id number] 10:49AM Nicolette Charles     Test Name Result Flag Reference   ECHO COMPLETE WITH CONTRAST IF INDICATED (Report)     [facility] [address]   [phone]     Transthoracic Echocardiogram   2D, M-mode, Doppler, and Color Doppler     Study date: 2017     Patient: Jessee Newton   MR number: [id number]   Account number: [de-identified]   : 1945   Age: 67 years   Gender: Female   Status: Outpatient   Location: [address] [place] [address]   Height: 61 in   Weight: 181 lb   BP: 128/ 78 mmHg     Indications: Edema     Diagnoses: R60 9 - Edema, unspecified     Sonographer: SONIA Lopez   Primary Physician: Prudence Muir DO   Referring Physician: Prudence Muir DO   Group: [facility]   Interpreting Physician: Sherral Closs, MD     SUMMARY     LEFT VENTRICLE:   Systolic function was normal by visual assessment  Ejection fraction was estimated to be 65 %  There were no regional wall motion abnormalities  There was assymetrical hypertrophy of the basal septum  RIGHT VENTRICLE:   The size was normal    Systolic function was normal      LEFT ATRIUM:   The atrium was mildly dilated  MITRAL VALVE:   There was trace regurgitation  AORTIC VALVE:   The valve was trileaflet  Leaflets exhibited mildly increased thickness, mild calcification, normal cuspal separation, and sclerosis  There was mild regurgitation  TRICUSPID VALVE:   There was trace regurgitation  Estimated peak PA pressure was 40 mmHg  HISTORY: PRIOR HISTORY: Murmur     PROCEDURE: The study was performed in the [address] [place] [address]  This was a routine study  The transthoracic approach was used  The study included complete 2D imaging, M-mode, complete spectral Doppler, and color Doppler  The   heart rate was 57 bpm, at the start of the study   Images were obtained from the parasternal, apical, subcostal, and suprasternal notch acoustic windows  Image quality was adequate  LEFT VENTRICLE: Size was normal  Systolic function was normal by visual assessment  Ejection fraction was estimated to be 65 %  There were no regional wall motion abnormalities  Wall thickness was normal  There was assymetrical hypertrophy   of the basal septum  No evidence of apical thrombus  DOPPLER: Left ventricular diastolic function parameters were normal      RIGHT VENTRICLE: The size was normal  Systolic function was normal  Wall thickness was normal      LEFT ATRIUM: The atrium was mildly dilated  RIGHT ATRIUM: Size was normal      MITRAL VALVE: Valve structure was normal  There was normal leaflet separation  DOPPLER: The transmitral velocity was within the normal range  There was no evidence for stenosis  There was trace regurgitation  AORTIC VALVE: The valve was trileaflet  Leaflets exhibited mildly increased thickness, mild calcification, normal cuspal separation, and sclerosis  DOPPLER: Transaortic velocity was within the normal range  There was no evidence for   stenosis  There was mild regurgitation  TRICUSPID VALVE: The valve structure was normal  There was normal leaflet separation  DOPPLER: The transtricuspid velocity was within the normal range  There was no evidence for stenosis  There was trace regurgitation  Estimated peak PA   pressure was 40 mmHg  PULMONIC VALVE: Leaflets exhibited normal thickness, no calcification, and normal cuspal separation  DOPPLER: The transpulmonic velocity was within the normal range  There was no significant regurgitation  PERICARDIUM: There was no pericardial effusion  The pericardium was normal in appearance  AORTA: The root exhibited normal size  SYSTEMIC VEINS: IVC: The inferior vena cava was normal in size   Respirophasic changes were normal      MEASUREMENT TABLES     OTHER ECHO MEASUREMENTS   (Reference normals)   Estimated CVP  5 mmHg  (--)     SYSTEM MEASUREMENT TABLES     2D   %FS: 25 83 %   AV Diam: 2 73 cm   EDV(Teich): 24 55 ml   EF(Cube): 59 2 %   EF(Teich): 52 87 %   ESV(Cube): 7 15 ml   ESV(Teich): 11 57 ml   IVSd: 1 53 cm   LA Area: 24 68 cm2   LA Diam: 3 94 cm   LVEDV MOD A4C: 77 61 ml   LVEF MOD A4C: 74 88 %   LVESV MOD A4C: 19 5 ml   LVIDd: 2 6 cm   LVIDs: 1 93 cm   LVLd A4C: 7 88 cm   LVLs A4C: 6 18 cm   LVPWd: 1 36 cm   RA Area: 15 85 cm2   RV Diam: 3 44 cm   SV MOD A4C: 58 11 ml   SV(Cube): 10 37 ml   SV(Teich): 12 98 ml     CW   AR Dec Ness: 2 45 m/s2   AR Dec Time: 1776 66 ms   AR PHT: 515 23 ms   AR Vmax: 4 3 m/s   AR maxP 94 mmHg   TR MaxP 17 mmHg   TR Vmax: 2 95 m/s     MM   TAPSE: 2 44 cm     PW   E': 0 05 m/s   E/E': 16 41   MV A Jose: 0 69 m/s   MV Dec Ness: 4 81 m/s2   MV DecT: 162 7 ms   MV E Jose: 0 78 m/s   MV E/A Ratio: 1 14     [employer] Accredited Echocardiography Laboratory     Prepared and electronically signed by     Luis Delacruz MD   Signed 2017 13:23:16

## 2018-01-13 NOTE — RESULT NOTES
Verified Results  (Q) LIPID PANEL WITH DIRECT LDL [id number] 01:54PM Aye Mccray     Test Name Result Flag Reference   CHOLESTEROL, TOTAL 210 mg/dL H 125-200   HDL CHOLESTEROL 61 mg/dL  > OR = 46   TRIGLICERIDES 91 mg/dL  <464   DIRECT  mg/dL H <130   Desirable range <100 mg/dL for patients with CHD or  diabetes and <70 mg/dL for diabetic patients with  known heart disease  CHOL/HDLC RATIO 3 4 (calc)  < OR = 5 0   NON HDL CHOLESTEROL 149 mg/dL (calc)     Target for non-HDL cholesterol is 30 mg/dL higher than   LDL cholesterol target  (1) COMPREHENSIVE METABOLIC PANEL [id number] 75:67AK Aye Mccray     Test Name Result Flag Reference   GLUCOSE 90 mg/dL  65-99   Fasting reference interval   UREA NITROGEN (BUN) 14 mg/dL  7-25   CREATININE 0 74 mg/dL  0 60-0 93   For patients >52years of age, the reference limit  for Creatinine is approximately 13% higher for people  identified as -American  eGFR NON-AFR   AMERICAN 82 mL/min/1 73m2  > OR = 60   eGFR AFRICAN AMERICAN 94 mL/min/1 73m2  > OR = 60   BUN/CREATININE RATIO   1-72   NOT APPLICABLE (calc)   SODIUM 139 mmol/L  135-146   POTASSIUM 3 5 mmol/L  3 5-5 3   CHLORIDE 102 mmol/L     CARBON DIOXIDE 24 mmol/L  19-30   CALCIUM 9 3 mg/dL  8 6-10 4   PROTEIN, TOTAL 6 9 g/dL  6 1-8 1   ALBUMIN 4 0 g/dL  3 6-5 1   GLOBULIN 2 9 g/dL (calc)  1 9-3 7   ALBUMIN/GLOBULIN RATIO 1 4 (calc)  1 0-2 5   BILIRUBIN, TOTAL 0 5 mg/dL  0 2-1 2   ALKALINE PHOSPHATASE 69 U/L     AST 15 U/L  10-35   ALT 11 U/L  6-29     (1) CBC/PLT/DIFF [id number] 01:54PM Aye Shazia     Test Name Result Flag Reference   WHITE BLOOD CELL COUNT 4 2 Thousand/uL  3 8-10 8   RED BLOOD CELL COUNT 4 36 Million/uL  3 80-5 10   HEMOGLOBIN 12 0 g/dL  11 7-15 5   HEMATOCRIT 37 0 %  35 0-45 0   MCV 85 0 fL  80 0-100 0   MCH 27 6 pg  27 0-33 0   MCHC 32 4 g/dL  32 0-36 0   RDW 15 7 % H 11 0-15 0   PLATELET COUNT 287 Thousand/uL  140-400   MPV 9 0 fL  7 5-11 5   ABSOLUTE NEUTROPHILS 1705 cells/uL  5662-5955   ABSOLUTE LYMPHOCYTES 2142 cells/uL  850-3900   ABSOLUTE MONOCYTES 294 cells/uL  200-950   ABSOLUTE EOSINOPHILS 34 cells/uL     ABSOLUTE BASOPHILS 25 cells/uL  0-200   NEUTROPHILS 40 6 %     LYMPHOCYTES 51 0 %     MONOCYTES 7 0 %     EOSINOPHILS 0 8 %     BASOPHILS 0 6 %       (Q) TSH, 3RD GENERATION W/REFLEX TO FT4 [id number] 01:54PM [facility]     Test Name Result Flag Reference   TSH W/REFLEX TO FT4 1 30 mIU/L  0 40-4 50     (Q) URINALYSIS REFLEX [id number] 01:54PM [facility]   REPORT COMMENT:  FASTING:YES     Test Name Result Flag Reference   COLOR YELLOW  YELLOW   APPEARANCE CLEAR  CLEAR   SPECIFIC GRAVITY 1 012  1 001-1 035   PH 5 5  5 0-8 0   GLUCOSE NEGATIVE  NEGATIVE   BILIRUBIN NEGATIVE  NEGATIVE   KETONES NEGATIVE  NEGATIVE   OCCULT BLOOD NEGATIVE  NEGATIVE   PROTEIN NEGATIVE  NEGATIVE   NITRITE NEGATIVE  NEGATIVE   LEUKOCYTE ESTERASE NEGATIVE  NEGATIVE       Discussion/Summary   The cholesterol could be a little better, but all else is OK   Dr Trina Lima

## 2018-01-14 VITALS
HEIGHT: 60 IN | WEIGHT: 182.25 LBS | SYSTOLIC BLOOD PRESSURE: 138 MMHG | BODY MASS INDEX: 35.78 KG/M2 | HEART RATE: 53 BPM | DIASTOLIC BLOOD PRESSURE: 76 MMHG

## 2018-01-16 NOTE — MISCELLANEOUS
Provider Comments  Provider Comments:   NO SHOW FOR YEARLY EXAM APPOINTMENT 4/10/17      Signatures   Electronically signed by : Zoë Machuca OM;  Apr 10 2017  4:45PM EST                       (Author)

## 2018-01-16 NOTE — RESULT NOTES
Verified Results  (1) CBC/ PLT (NO DIFF) [id number] 08:47AM Nilda Hernandez Order Number: [id number]     Test Name Result Flag Reference   HEMATOCRIT 39 0 %  34 8-46 1   HEMOGLOBIN 12 6 g/dL  11 5-15 4   MCHC 32 3 g/dL  31 4-37 4   MCH 28 1 pg  26 8-34 3   MCV 87 fL  82-98   PLATELET COUNT 849 Thousands/uL  149-390   RBC COUNT 4 48 Million/uL  3 81-5 12   RDW 14 9 %  11 6-15 1   WBC COUNT 4 46 Thousand/uL  4 31-10 16   MPV 10 3 fL  8 9-12 7     (1) COMPREHENSIVE METABOLIC PANEL [id number] 85:07OS Nilda Hernandez Order Number: [id number]     Test Name Result Flag Reference   SODIUM 140 mmol/L  136-145   POTASSIUM 3 5 mmol/L  3 5-5 3   CHLORIDE 104 mmol/L  100-108   CARBON DIOXIDE 29 mmol/L  21-32   ANION GAP (CALC) 7 mmol/L  4-13   BLOOD UREA NITROGEN 19 mg/dL  5-25   CREATININE 0 75 mg/dL  0 60-1 30   Standardized to IDMS reference method   CALCIUM 9 3 mg/dL  8 3-10 1   BILI, TOTAL 0 42 mg/dL  0 20-1 00   ALK PHOSPHATAS 100 U/L     ALT (SGPT) 24 U/L  12-78   AST(SGOT) 17 U/L  5-45   ALBUMIN 4 0 g/dL  3 5-5 0   TOTAL PROTEIN 7 6 g/dL  6 4-8 2   eGFR Non-African American      >60 0 ml/min/1 73[address] [facility] Disease Education Program recommendations are as follows:  GFR calculation is accurate only with a steady state creatinine  Chronic Kidney disease less than 60 ml/min/1 73 sq  meters  Kidney failure less than 15 ml/min/1 73 sq  meters  GLUCOSE FASTING 104 mg/dL H 65-99     (1) LIPID PANEL, FASTING [id number] 08:47AM Nilda Hernandez Order Number: [id number]     Test Name Result Flag Reference   CHOLESTEROL 197 mg/dL     HDL,DIRECT 62 mg/dL H 40-60   Specimen collection should occur prior to Metamizole administration due to the potential for falsely depressed results     LDL CHOLESTEROL CALCULATED 113 mg/dL H 0-100   Triglyceride:         Normal              <150 mg/dl       Borderline High    150-199 mg/dl       High               200-499 mg/dl       Very High          >499 mg/dl  Cholesterol:         Desirable        <200 mg/dl      Borderline High  200-239 mg/dl      High             >239 mg/dl  HDL Cholesterol:        High    >59 mg/dL      Low     <41 mg/dL  LDL CALCULATED:    This screening LDL is a calculated result  It does not have the accuracy of the Direct Measured LDL in the monitoring of patients with hyperlipidemia and/or statin therapy  Direct Measure LDL (EGX377) must be ordered separately in these patients  TRIGLYCERIDES 112 mg/dL  <=150   Specimen collection should occur prior to N-Acetylcysteine or Metamizole administration due to the potential for falsely depressed results  (1) TSH WITH FT4 REFLEX 29Jun2017 08:47AM Jennifer Mc Order Number: [id number]     Test Name Result Flag Reference   TSH 1 350 uIU/mL  0 358-3 740   Patients undergoing fluorescein dye angiography may retain small amounts of fluorescein in the body for 48-72 hours post procedure  Samples containing fluorescein can produce falsely depressed TSH values  If the patient had this procedure,a specimen should be resubmitted post fluorescein clearance            The recommended reference ranges for TSH during pregnancy are as follows:  First trimester 0 1 to 2 5 uIU/mL  Second trimester  0 2 to 3 0 uIU/mL  Third trimester 0 3 to 3 0 uIU/m

## 2018-01-17 NOTE — PROGRESS NOTES
Assessment    1  Recurrent dislocation, right shoulder (281 31) (M24 411)    Plan  Recurrent dislocation, right shoulder    · * MRI SHOULDER RIGHT WO CONTRAST; Status:Hold For - Scheduling; Requested  RVM:[id number];    · 1 - Sherman Webb MD, Nicolette Perez  (Orthopedic Surgery) Physician Referral  Consult  Status: Hold For -  Scheduling  Requested for: 37MKP4296  Care Summary provided  : Yes    Discussion/Summary  Patient discussion: discussed with the patient, 20 minute visit, greater than half of the time was spent on counseling  I explained my current clinical and radiological findings to Valentina Malave today  Unfortunately, she has had recurrent dislocations of her right shoulder joint  I explained that her dislocations have happened with relatively low intensity trauma  Hence, I will request an MRI of the right shoulder for further evaluation and would refer her to my orthopedic colleague Dr Sherman Webb for his expert opinion  History of Present Illness  Valentina Malave is here today for a followup of her right him to do shoulder dislocation  Unfortunately, she again dislocated her right shoulder joint 5 days ago at home while trying to reach out for an object  Including her most recent episode, she has now had 3 right glenohumeral anterior dislocations in the last one half months with relatively low intensity trauma  She was treated in the [facility]'s emergency room and the right shoulder was reduced within acceptable postreduction plain radiograph  Today, she denies any tingling numbness or weakness of the right lower extremity and is being treated with a right shoulder immobilizer brace  At her last office visit, she was referred to initiate physical therapy which she has not yet started  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  Active Problems    1  Anterior shoulder dislocation, right, initial encounter (831 01) (S43 014A)   2  Benign essential hypertension (401 1) (I10)   3   Closed anterior dislocation of right shoulder, subsequent encounter (V58 89) (S43 014D)   4  Edema (782 3) (R60 9)   5  Encounter for screening for other nervous system disorder (V80 09) (Z13 858)   6  Encounter for special screening examination for genitourinary disorder (V81 6) (Z13 89)   7  History of allergy (V15 09) (Z88 9)   8  Leg pain, left (729 5) (M79 605)   9  Lower back pain (724 2) (M54 5)   10  Menopausal state (627 2) (N95 1)   11  Murmur (785 2) (R01 1)   12  Need for influenza vaccination (V04 81) (Z23)   13  Neuritis (729 2) (M79 2)   14  Recurrent dislocation, right shoulder (718 31) (M24 411)   15  Varicose Veins Of Lower Extremities (454 9)    Past Medical History    · History of Encounter for screening mammogram for malignant neoplasm of breast  (V76 12) (Z12 31)   · History of acute sinusitis (V12 69) (Z87 09)    The active problems and past medical history were reviewed and updated today  Surgical History    · History of Arthroscopy Knee Left   · History of Complete Colonoscopy   · History of Incisional Breast Biopsy   · History of Tubal Ligation    The surgical history was reviewed and updated today  Family History    · Family history of Stroke Syndrome (V17 1)    · Family history of Cancer    · Family history of Hypertension (V17 49)    · Family history of Colon Cancer (V16 0)   · Family history of Hypertension (V17 49)    · Family history of Hypertension (V17 49)    · Family history of Uterine cancer (80) (C55)    The family history was reviewed and updated today  Social History    · Being A Social Drinker   · Coffee   · Educational Level   · HS grad   · Marital History -    · 5 children   · Never A Smoker   · Occupation:   · caregiver   · Tea  The social history was reviewed and updated today  The social history was reviewed and is unchanged  Current Meds   1  Hydrochlorothiazide 25 MG Oral Tablet; take 1 tablet by mouth daily;    Therapy: 13DGZ2250 to (Evaluate:74Esm1875)  Requested for: 14FJX2551; Last   Rx:29Spx7233 Ordered   2  Lisinopril 5 MG Oral Tablet; take 1 tablet by mouth daily as directed; Therapy: 48MRY3900 to (Evaluate:09Aug2016)  Requested for: 79VWX3668; Last   Rx:20Rtm4111 Ordered   3  Metoprolol Tartrate 50 MG Oral Tablet; take 1 tablet by mouth twice a day; Therapy: 88AKX5033 to (Evaluate:09Aug2016)  Requested for: 41MNX5048; Last   Rx:85Vuv5379 Ordered    The medication list was reviewed and updated today  Allergies    1  No Known Drug Allergies    Vitals   Recorded: 21Jan2016 01:10PM   Heart Rate 70   Systolic 277   Diastolic 95   Height 5 ft 1 in   Weight 180 lb    BMI Calculated 34 01   BSA Calculated 1 8     Physical Exam    Right Shoulder: Appearance: Normal  Diffuse  ROM: Deferred  Motor: Deferred  Special Tests: Deferred  Constitutional - General appearance: Normal    Neurologic - Cranial nerves: Normal  Sensation: Normal  Upper extremity peripheral neuro exam: Normal    Psychiatric - Orientation to person, place, and time: Normal  Mood and affect: Normal    Eyes   Conjunctiva and lids: Normal     Pupils and irises: Normal        Results/Data  I personally reviewed the films/images/results in the office today  My interpretation follows  X-ray Review As noted in the history of present illness  Future Appointments    Date/Time Provider Specialty Site   01/28/2016 03:30 PM MICHAEL Silva  Family Medicine [facility]   02/25/2016 02:15 PM MICHAEL Lal  Orthopedic Surgery [address]     Signatures   Electronically signed by :  MICHAEL Goodwin ; Jan 21 2016  1:31PM EST                       (Author)

## 2018-01-17 NOTE — RESULT NOTES
Verified Results  * DXA BONE DENSITY SPINE HIP AND PELVIS [id number] 04:46YD Sepideh Freire Order Number: [id number]    - Patient Instructions: To schedule this appointment, please contact Central Scheduling at [phone]  Test Name Result Flag Reference   DXA BONE DENSITY SPINE HIP AND PELVIS (Report)     CENTRAL DXA SCAN     CLINICAL HISTORY:  70year old postmenopausal -American female who does not exercise and who does not take calcium or vitamin D supplements  TECHNIQUE: Bone densitometry was performed using a Hologic Horizon A bone densitometer  Regions of interest appear properly placed  There is marked dextroscoliosis of the lumbar spine, associated with degenerative change, which can limit accurate    evaluation of bone mineral density in obvious fractures or other confounding variables which could limit the study  COMPARISON: February 23, 2015     RESULTS:    LUMBAR SPINE: L1-L4:   BMD 1 060 gm/cm2   T-score 0 1   Z-score 1 6     LEFT TOTAL HIP:   BMD 1 124 gm/cm2   T-score 1 5   Z-score 1 8     LEFT FEMORAL NECK:   BMD 0 756 gm/cm2   T-score -0 8   Z-score 0 1     RIGHT FOREARM-ONE 3RD REGION:   BMD 0 6-7 gm/cm2   T-score -1 0   Z-score 1 3       IMPRESSION:   1  Based on the [facility] classification, this study is normal and the patient is considered at low risk for fracture  2  The 10 year risk of hip fracture is less than 0 1 %, with the 10 year risk of major osteoporotic fracture being less than 0 1 %, as calculated by the WHO fracture risk assessment tool (FRAX)  The current NOF guidelines recommend treating patients    with FRAX 10 year risk score of >3% for hip fracture and >20% for major osteoporotic fracture  3  Since the prior study, there has been a significant decrease in BMD in the lumbar spine of 0 046 Gm/cm2 or 4 1%  In the right forearm, there has been a significant decrease in BMD of 0 021 gm/cm2 or 3 2%   There has been no significant change in BMD in the left hip  These changes do our own least significant change and, therefore, are statistically significant within 95% confidence level  4  A daily intake of calcium of at least 1200 mg and vitamin D, 800-1000 IU, as well as weight bearing and muscle strengthening exercise, fall prevention and avoidance of tobacco and excessive alcohol intake  5  Repeat DXA in 18 - 24 months, on the same machine, as clinically indicated         WHO CLASSIFICATION:   Normal (a T-score of -1 0 or higher)   Low bone mineral density (a T-score of less than -1 0 but higher than -2 5)   Osteoporosis (a T-score of -2 5 or less)   Severe osteoporosis (a T-score of -2 5 or less with a fragility fracture)             Workstation performed: [id number]     Signed by:   Madhu Loaiza MD   5/3/17

## 2018-01-18 NOTE — PROGRESS NOTES
Assessment    1  Benign essential hypertension (401 1) (I10)   2  Closed anterior dislocation of right shoulder, subsequent encounter (V58 89)   (S43 014D)    Plan  Benign essential hypertension    · Follow-up visit in 4 Months Evaluation and Treatment  Follow-up  Status: Hold For -  Scheduling  Requested for: 28Jan2016    Chief Complaint  BP check      History of Present Illness  She has been having an issue with recurrent right shoulder dislocation x 3 and seeing Dr Carmen Cornell  She is using a sling  She sees the surgeon 2/16 (MRI 2/5)  She hasn't been able to do her mammogram due to the shoulder  No chest pain, SOB, palpitations  She first dislocated it when she was in her 25s  Active Problems    1  Anterior shoulder dislocation, right, initial encounter (831 01) (S43 014A)   2  Benign essential hypertension (401 1) (I10)   3  Closed anterior dislocation of right shoulder, subsequent encounter (V58 89)   (S43 014D)   4  Edema (782 3) (R60 9)   5  Encounter for screening for other nervous system disorder (V80 09) (Z13 858)   6  Encounter for special screening examination for genitourinary disorder (V81 6) (Z13 89)   7  History of allergy (V15 09) (Z88 9)   8  Leg pain, left (729 5) (M79 605)   9  Lower back pain (724 2) (M54 5)   10  Menopausal state (627 2) (N95 1)   11  Murmur (785 2) (R01 1)   12  Need for influenza vaccination (V04 81) (Z23)   13  Neuritis (729 2) (M79 2)   14  Recurrent dislocation, right shoulder (718 31) (M24 411)   15  Varicose Veins Of Lower Extremities (454 9)    Past Medical History    1  History of Encounter for screening mammogram for malignant neoplasm of breast   (V76 12) (Z12 31)   2  History of acute sinusitis (V12 69) (Z87 09)    Surgical History    1  History of Arthroscopy Knee Left   2  History of Complete Colonoscopy   3  History of Incisional Breast Biopsy   4  History of Tubal Ligation    Family History    1  Family history of Stroke Syndrome (V17 1)    2   Family history of Cancer    3  Family history of Hypertension (V17 49)    4  Family history of Colon Cancer (V16 0)   5  Family history of Hypertension (V17 49)    6  Family history of Hypertension (V17 49)    7  Family history of Uterine cancer (80) (C55)    Social History    · Being A Social Drinker   · Coffee   · Educational Level   · Marital History -    · Never A Smoker   · Occupation:   · Tea    Current Meds   1  Hydrochlorothiazide 25 MG Oral Tablet; take 1 tablet by mouth daily; Therapy: 12PTW0055 to (Evaluate:93Vdr9420)  Requested for: 84AXH3434; Last   Rx:57Rjb0071 Ordered   2  Lisinopril 5 MG Oral Tablet; take 1 tablet by mouth daily as directed; Therapy: 48SLH2718 to (Evaluate:09Aug2016)  Requested for: 31EFV7994; Last   Rx:65Zne4136 Ordered   3  Metoprolol Tartrate 50 MG Oral Tablet; take 1 tablet by mouth twice a day; Therapy: 46VAW1660 to (Evaluate:43Kfn5697)  Requested for: 27DJG2614; Last   Rx:31Cfn3737 Ordered    Allergies    1  No Known Drug Allergies    Vitals  Vital Signs [Data Includes: Current Encounter]    Recorded: 19URZ3908 02:10PM   Heart Rate 72   Respiration 16   Systolic 670   Diastolic 64   Height 5 ft 1 in   Weight 179 lb    BMI Calculated 33 82   BSA Calculated 1 8     Physical Exam    Constitutional   General appearance: No acute distress, well appearing and well nourished  Pulmonary   Respiratory effort: No increased work of breathing or signs of respiratory distress  Auscultation of lungs: Clear to auscultation  Cardiovascular   Auscultation of heart: Normal rate and rhythm, normal S1 and S2, without murmurs  Examination of extremities for edema and/or varicosities: Normal     Carotid pulses: Normal     Abdomen   Abdomen: Non-tender, no masses      Musculoskeletal   Inspection/palpation of joints, bones, and muscles: Abnormal   (Right arm in a sling)   Psychiatric   Orientation to person, place, and time: Normal     Mood and affect: Normal          Health Management  History of Well woman exam with routine gynecological exam   BREAST EXAM; every 1 year; Next Due: 06HAW1123; Overdue    Future Appointments    Date/Time Provider Specialty Site   02/16/2016 10:00 AM MICHAEL [place] 125   02/25/2016 02:15 PM MICHAEL [place] 125     Signatures   Electronically signed by : MICHAEL Clark ; Jan 28 2016  2:24PM EST                       (Author)

## 2018-01-22 VITALS
OXYGEN SATURATION: 95 % | DIASTOLIC BLOOD PRESSURE: 82 MMHG | BODY MASS INDEX: 36.13 KG/M2 | HEART RATE: 68 BPM | WEIGHT: 185 LBS | SYSTOLIC BLOOD PRESSURE: 154 MMHG | TEMPERATURE: 98.2 F

## 2018-01-22 VITALS
SYSTOLIC BLOOD PRESSURE: 148 MMHG | HEART RATE: 80 BPM | TEMPERATURE: 98.4 F | WEIGHT: 182.5 LBS | OXYGEN SATURATION: 98 % | BODY MASS INDEX: 34.48 KG/M2 | DIASTOLIC BLOOD PRESSURE: 82 MMHG

## 2018-01-23 VITALS
HEART RATE: 54 BPM | DIASTOLIC BLOOD PRESSURE: 71 MMHG | BODY MASS INDEX: 35.34 KG/M2 | WEIGHT: 180 LBS | SYSTOLIC BLOOD PRESSURE: 148 MMHG | HEIGHT: 60 IN

## 2018-01-23 NOTE — RESULT NOTES
Discussion/Summary   please inform pt that labs are stable -- pay close attention to following low-carb diet due to her prediabetes -- call next week with BP readings as reviewed at last week's appointment     Verified Results  (1) CBC/ PLT (NO DIFF) [id number] 09:41AM [facility] Order Number: [id number]     Test Name Result Flag Reference   HEMATOCRIT 41 3 %  34 8-46 1   HEMOGLOBIN 13 7 g/dL  11 5-15 4   MCHC 33 2 g/dL  31 4-37 4   MCH 27 8 pg  26 8-34 3   MCV 84 fL  82-98   PLATELET COUNT 785 Thousands/uL  149-390   RBC COUNT 4 93 Million/uL  3 81-5 12   RDW 14 3 %  11 6-15 1   WBC COUNT 6 56 Thousand/uL  4 31-10 16   MPV 10 9 fL  8 9-12 7     (1) COMPREHENSIVE METABOLIC PANEL [id number] 70:94RD [facility] Order Number: [id number]     Test Name Result Flag Reference   SODIUM 136 mmol/L  136-145   POTASSIUM 3 6 mmol/L  3 5-5 3   CHLORIDE 102 mmol/L  100-108   CARBON DIOXIDE 29 mmol/L  21-32   ANION GAP (CALC) 5 mmol/L  4-13   BLOOD UREA NITROGEN 21 mg/dL  5-25   CREATININE 0 86 mg/dL  0 60-1 30   Standardized to IDMS reference method   CALCIUM 9 4 mg/dL  8 3-10 1   BILI, TOTAL 0 44 mg/dL  0 20-1 00   ALK PHOSPHATAS 104 U/L     ALT (SGPT) 21 U/L  12-78   Specimen collection should occur prior to Sulfasalazine and/or Sulfapyridine administration due to the potential for falsely depressed results  AST(SGOT) 10 U/L  5-45   Specimen collection should occur prior to Sulfasalazine administration due to the potential for falsely depressed results  ALBUMIN 3 7 g/dL  3 5-5 0   TOTAL PROTEIN 7 8 g/dL  6 4-8 2   eGFR 78 ml/min/1 73sq m     [facility] Disease Education Program recommendations are as follows:  GFR calculation is accurate only with a steady state creatinine  Chronic Kidney disease less than 60 ml/min/1 73 sq  meters  Kidney failure less than 15 ml/min/1 73 sq  meters     GLUCOSE FASTING 105 mg/dL H 65-99   Specimen collection should occur prior to Sulfasalazine administration due to the potential for falsely depressed results  Specimen collection should occur prior to Sulfapyridine administration due to the potential for falsely elevated results  (1) HEMOGLOBIN A1C [id number] 09:41AM [facility] Order Number: [id number]     Test Name Result Flag Reference   HEMOGLOBIN A1C 6 2 %  4 2-6 3   EST  AVG  GLUCOSE 131 mg/dl       (1) LIPID PANEL, FASTING [id number] 09:41AM [facility] Order Number: [id number]     Test Name Result Flag Reference   CHOLESTEROL 222 mg/dL H    HDL,DIRECT 78 mg/dL H 40-60   Specimen collection should occur prior to Metamizole administration due to the potential for falsley depressed results  LDL CHOLESTEROL CALCULATED 128 mg/dL H 0-100   Triglyceride:        Normal <150 mg/dl   Borderline High 150-199 mg/dl   High 200-499 mg/dl   Very High >499 mg/dl      Cholesterol:       Desirable <200 mg/dl    Borderline High 200-239 mg/dl    High >239 mg/dl      HDL Cholesterol:       High>59 mg/dL    Low <41 mg/dL      This screening LDL is a calculated result  It does not have the accuracy of the Direct Measured LDL in the monitoring of patients with hyperlipidemia and/or statin therapy  Direct Measure LDL (WTT762) must be ordered separately in these patients  TRIGLYCERIDES 80 mg/dL  <=150   Specimen collection should occur prior to N-Acetylcysteine or Metamizole administration due to the potential for falsely depressed results  (1) TSH WITH FT4 REFLEX [id number] 09:41AM [facility] Order Number: [id number]     Test Name Result Flag Reference   TSH 0 742 uIU/mL  0 358-3 740   Patients undergoing fluorescein dye angiography may retain small amounts of fluorescein in the body for 48-72 hours post procedure  Samples containing fluorescein can produce falsely depressed TSH values  If the patient had this procedure,a specimen should be resubmitted post fluorescein clearance            The recommended reference ranges for TSH during pregnancy are as follows:  First trimester 0 1 to 2 5 uIU/mL  Second trimester  0 2 to 3 0 uIU/mL  Third trimester 0 3 to 3 0 uIU/m

## 2018-01-31 PROBLEM — N32.81 OAB (OVERACTIVE BLADDER): Status: ACTIVE | Noted: 2017-04-25

## 2018-01-31 PROBLEM — M25.561 RIGHT KNEE PAIN: Status: ACTIVE | Noted: 2017-12-05

## 2018-01-31 PROBLEM — R73.01 IMPAIRED FASTING GLUCOSE: Status: ACTIVE | Noted: 2017-11-29

## 2018-02-01 ENCOUNTER — OFFICE VISIT (OUTPATIENT)
Dept: FAMILY MEDICINE CLINIC | Facility: OTHER | Age: 73
End: 2018-02-01
Payer: COMMERCIAL

## 2018-02-01 VITALS
SYSTOLIC BLOOD PRESSURE: 128 MMHG | WEIGHT: 177.5 LBS | DIASTOLIC BLOOD PRESSURE: 80 MMHG | BODY MASS INDEX: 37.26 KG/M2 | HEART RATE: 78 BPM | OXYGEN SATURATION: 98 % | HEIGHT: 58 IN | TEMPERATURE: 98.7 F

## 2018-02-01 DIAGNOSIS — I10 BENIGN ESSENTIAL HYPERTENSION: Primary | ICD-10-CM

## 2018-02-01 PROCEDURE — 3074F SYST BP LT 130 MM HG: CPT | Performed by: FAMILY MEDICINE

## 2018-02-01 PROCEDURE — 3079F DIAST BP 80-89 MM HG: CPT | Performed by: FAMILY MEDICINE

## 2018-02-01 PROCEDURE — 99213 OFFICE O/P EST LOW 20 MIN: CPT | Performed by: FAMILY MEDICINE

## 2018-02-01 RX ORDER — IBUPROFEN 800 MG/1
1 TABLET ORAL EVERY 8 HOURS
COMMUNITY
Start: 2016-09-02 | End: 2020-08-24 | Stop reason: ALTCHOICE

## 2018-02-01 RX ORDER — FUROSEMIDE 20 MG/1
TABLET ORAL
COMMUNITY
Start: 2015-08-13 | End: 2020-08-24 | Stop reason: ALTCHOICE

## 2018-02-01 NOTE — PROGRESS NOTES
Subjective:      Patient ID: Felipe Goodwin is a 67 y o  female  Hypertension   This is a chronic problem  The current episode started more than 1 year ago  The problem has been gradually improving since onset  The problem is controlled  Pertinent negatives include no anxiety, blurred vision, chest pain, headaches, malaise/fatigue, palpitations or shortness of breath  There are no associated agents to hypertension  Risk factors for coronary artery disease include obesity, sedentary lifestyle, stress, post-menopausal state and family history  The current treatment provides significant improvement  There are no compliance problems  There is no history of angina, kidney disease, CAD/MI, CVA, renovascular disease or a thyroid problem  Home BP readings 475-508 systolic, 96A diastolic       The following portions of the patient's history were reviewed and updated as appropriate: allergies, current medications, past family history, past medical history, past social history, past surgical history and problem list       Review of Systems   Constitutional: Negative for chills, fatigue, fever and malaise/fatigue  HENT: Negative for congestion, ear pain, sinus pain and sore throat  Eyes: Negative for blurred vision, pain and visual disturbance  Respiratory: Negative for cough, chest tightness, shortness of breath and wheezing  Cardiovascular: Negative for chest pain and palpitations  Gastrointestinal: Negative for abdominal pain, constipation, diarrhea, nausea and vomiting  Endocrine: Negative for polydipsia, polyphagia and polyuria  Genitourinary: Negative for dysuria and flank pain  Musculoskeletal: Negative for back pain  Skin: Negative for color change and rash  Neurological: Negative for dizziness, syncope and headaches  Psychiatric/Behavioral: Negative for decreased concentration  The patient is not nervous/anxious            Objective:    /80 (BP Location: Left arm, Patient Position: Sitting, Cuff Size: Adult)   Pulse 78   Temp 98 7 °F (37 1 °C) (Tympanic)   Ht 4' 10" (1 473 m)   Wt 80 5 kg (177 lb 8 oz)   SpO2 98%   BMI 37 10 kg/m²      Physical Exam   Constitutional: She is oriented to person, place, and time  She appears well-developed and well-nourished  No distress  HENT:   Head: Normocephalic and atraumatic  Right Ear: External ear normal    Left Ear: External ear normal    Mouth/Throat: Oropharynx is clear and moist    Eyes: Conjunctivae and EOM are normal  Pupils are equal, round, and reactive to light  No scleral icterus  Neck: Normal range of motion  Neck supple  No thyromegaly present  Cardiovascular: Normal rate, regular rhythm and normal heart sounds  Pulmonary/Chest: Effort normal and breath sounds normal  She has no wheezes  Abdominal: Soft  Bowel sounds are normal  There is no tenderness  Musculoskeletal: Normal range of motion  She exhibits no edema or tenderness  Lymphadenopathy:     She has no cervical adenopathy  Neurological: She is alert and oriented to person, place, and time  She has normal reflexes  No cranial nerve deficit  Skin: Skin is warm and dry  No rash noted  Psychiatric: She has a normal mood and affect  Her behavior is normal            Assessment/Plan:    Benign essential hypertension   - stable, continue current medications, call for readings > 140/90    Other orders  -     furosemide (LASIX) 20 mg tablet; Take by mouth  -     ibuprofen (MOTRIN) 800 mg tablet; Take 1 tablet by mouth every 8 (eight) hours  -     Naproxen Sodium (ALEVE PO); Take by mouth          Retention appears to be well controlled at this time  Continue current medications  Patient encouraged to monitor blood pressure at home and call for more than 2 readings greater than 140/90 in 1 week  Plan to recheck blood pressure at follow-up in 6 months  RTO 6 months for blood pressure check    The patient indicates understanding of these issues and agrees with the plan          Leandrew Bare, DO

## 2018-03-19 DIAGNOSIS — I10 ESSENTIAL HYPERTENSION: Primary | ICD-10-CM

## 2018-03-20 ENCOUNTER — OFFICE VISIT (OUTPATIENT)
Dept: OBGYN CLINIC | Facility: CLINIC | Age: 73
End: 2018-03-20
Payer: COMMERCIAL

## 2018-03-20 VITALS
SYSTOLIC BLOOD PRESSURE: 174 MMHG | DIASTOLIC BLOOD PRESSURE: 89 MMHG | HEART RATE: 76 BPM | WEIGHT: 180 LBS | BODY MASS INDEX: 35.34 KG/M2 | HEIGHT: 60 IN

## 2018-03-20 DIAGNOSIS — M17.11 PRIMARY OSTEOARTHRITIS OF RIGHT KNEE: Primary | ICD-10-CM

## 2018-03-20 PROCEDURE — 99213 OFFICE O/P EST LOW 20 MIN: CPT | Performed by: ORTHOPAEDIC SURGERY

## 2018-03-20 PROCEDURE — 20610 DRAIN/INJ JOINT/BURSA W/O US: CPT | Performed by: ORTHOPAEDIC SURGERY

## 2018-03-20 RX ORDER — METHYLPREDNISOLONE ACETATE 40 MG/ML
1 INJECTION, SUSPENSION INTRA-ARTICULAR; INTRALESIONAL; INTRAMUSCULAR; SOFT TISSUE
Status: COMPLETED | OUTPATIENT
Start: 2018-03-20 | End: 2018-03-20

## 2018-03-20 RX ORDER — METOPROLOL TARTRATE 50 MG/1
50 TABLET, FILM COATED ORAL 2 TIMES DAILY
Qty: 90 TABLET | Refills: 0 | Status: SHIPPED | OUTPATIENT
Start: 2018-03-20 | End: 2018-05-10 | Stop reason: SDUPTHER

## 2018-03-20 RX ADMIN — METHYLPREDNISOLONE ACETATE 1 ML: 40 INJECTION, SUSPENSION INTRA-ARTICULAR; INTRALESIONAL; INTRAMUSCULAR; SOFT TISSUE at 15:36

## 2018-03-20 NOTE — PROGRESS NOTES
Patient Name:  Pricila Montilla  MRN:  [id number]    Assessment & Plan    Right knee DJD  1  Steroid injection performed today  2  Continue assistive device for ambulation as needed  3  Activity modification and NSAIDs as needed  4  Provided patient with prescription for lateral  brace, which she will obtain at [facility]  5  Call to arrange Synvisc-One injection if pain persists after steroid injection today  Subjective    Patient returns today with ongoing pain in her right knee  She states that the steroid injection on 12/5/17 helped somewhat  She takes Aleve occasionally ambulates with a cane  The pain localizes anteriorly and is worse with weight-bearing activity  She states that she was never contacted about the lateral  brace but is still interested in trying that  General ROS:  Negative for fever, lethargy/malaise, or night sweats  Objective    BP (!) 174/89   Pulse 76   Ht 5' (1 524 m)   Wt 81 6 kg (180 lb)   BMI 35 15 kg/m²     Right knee:  Small effusion  No joint line tenderness  Range of motion is extension 0° and flexion 105°  Stable with varus and valgus stress  No extensor lag  Skin is intact without erythema  No lower extremity edema  Mood and affect are appropriate        Large joint arthrocentesis  Date/Time: 3/20/2018 3:36 PM  Consent given by: patient  Site marked: site marked  Procedure Details  Location: knee - R knee  Needle size: 22 G  Approach: anterolateral  Medications administered: 1 mL methylPREDNISolone acetate 40 mg/mL    Patient tolerance: patient tolerated the procedure well with no immediate complications  Dressing:  Sterile dressing applied

## 2018-05-10 DIAGNOSIS — I10 ESSENTIAL HYPERTENSION: ICD-10-CM

## 2018-05-14 RX ORDER — METOPROLOL TARTRATE 50 MG/1
50 TABLET, FILM COATED ORAL 2 TIMES DAILY
Qty: 90 TABLET | Refills: 0 | Status: SHIPPED | OUTPATIENT
Start: 2018-05-14

## 2020-08-24 ENCOUNTER — APPOINTMENT (EMERGENCY)
Dept: RADIOLOGY | Facility: HOSPITAL | Age: 75
End: 2020-08-24
Payer: MEDICARE

## 2020-08-24 ENCOUNTER — HOSPITAL ENCOUNTER (EMERGENCY)
Facility: HOSPITAL | Age: 75
Discharge: HOME | End: 2020-08-25
Attending: EMERGENCY MEDICINE | Admitting: EMERGENCY MEDICINE
Payer: MEDICARE

## 2020-08-24 DIAGNOSIS — M25.572 LEFT ANKLE PAIN: ICD-10-CM

## 2020-08-24 DIAGNOSIS — M25.472 LEFT ANKLE SWELLING: ICD-10-CM

## 2020-08-24 DIAGNOSIS — M25.562 ACUTE PAIN OF LEFT KNEE: Primary | ICD-10-CM

## 2020-08-24 DIAGNOSIS — E79.0 ELEVATED BLOOD URIC ACID LEVEL: ICD-10-CM

## 2020-08-24 LAB
ALBUMIN SERPL BCP-MCNC: 4 G/DL (ref 3.5–5)
ALP SERPL-CCNC: 93 U/L (ref 46–116)
ALT SERPL W P-5'-P-CCNC: 26 U/L (ref 12–78)
ANION GAP SERPL CALCULATED.3IONS-SCNC: 8 MMOL/L (ref 4–13)
AST SERPL W P-5'-P-CCNC: 19 U/L (ref 5–45)
BASOPHILS # BLD AUTO: 0.04 THOUSANDS/ΜL (ref 0–0.1)
BASOPHILS NFR BLD AUTO: 1 % (ref 0–1)
BILIRUB SERPL-MCNC: 0.32 MG/DL (ref 0.2–1)
BUN SERPL-MCNC: 19 MG/DL (ref 5–25)
CALCIUM SERPL-MCNC: 9.2 MG/DL (ref 8.3–10.1)
CHLORIDE SERPL-SCNC: 106 MMOL/L (ref 100–108)
CO2 SERPL-SCNC: 28 MMOL/L (ref 21–32)
CREAT SERPL-MCNC: 0.98 MG/DL (ref 0.6–1.3)
D DIMER PPP FEU-MCNC: 1.01 UG/ML FEU
EOSINOPHIL # BLD AUTO: 0.09 THOUSAND/ΜL (ref 0–0.61)
EOSINOPHIL NFR BLD AUTO: 2 % (ref 0–6)
ERYTHROCYTE [DISTWIDTH] IN BLOOD BY AUTOMATED COUNT: 14.3 % (ref 11.6–15.1)
GFR SERPL CREATININE-BSD FRML MDRD: 65 ML/MIN/1.73SQ M
GLUCOSE SERPL-MCNC: 116 MG/DL (ref 65–140)
HCT VFR BLD AUTO: 38.9 % (ref 34.8–46.1)
HGB BLD-MCNC: 12.7 G/DL (ref 11.5–15.4)
IMM GRANULOCYTES # BLD AUTO: 0.01 THOUSAND/UL (ref 0–0.2)
IMM GRANULOCYTES NFR BLD AUTO: 0 % (ref 0–2)
LYMPHOCYTES # BLD AUTO: 2.26 THOUSANDS/ΜL (ref 0.6–4.47)
LYMPHOCYTES NFR BLD AUTO: 41 % (ref 14–44)
MCH RBC QN AUTO: 28.5 PG (ref 26.8–34.3)
MCHC RBC AUTO-ENTMCNC: 32.6 G/DL (ref 31.4–37.4)
MCV RBC AUTO: 87 FL (ref 82–98)
MONOCYTES # BLD AUTO: 0.51 THOUSAND/ΜL (ref 0.17–1.22)
MONOCYTES NFR BLD AUTO: 9 % (ref 4–12)
NEUTROPHILS # BLD AUTO: 2.59 THOUSANDS/ΜL (ref 1.85–7.62)
NEUTS SEG NFR BLD AUTO: 47 % (ref 43–75)
NRBC BLD AUTO-RTO: 0 /100 WBCS
PLATELET # BLD AUTO: 230 THOUSANDS/UL (ref 149–390)
PMV BLD AUTO: 9.8 FL (ref 8.9–12.7)
POTASSIUM SERPL-SCNC: 3.4 MMOL/L (ref 3.5–5.3)
PROT SERPL-MCNC: 7.6 G/DL (ref 6.4–8.2)
RBC # BLD AUTO: 4.45 MILLION/UL (ref 3.81–5.12)
SODIUM SERPL-SCNC: 142 MMOL/L (ref 136–145)
WBC # BLD AUTO: 5.5 THOUSAND/UL (ref 4.31–10.16)

## 2020-08-24 PROCEDURE — 86038 ANTINUCLEAR ANTIBODIES: CPT | Performed by: EMERGENCY MEDICINE

## 2020-08-24 PROCEDURE — 84550 ASSAY OF BLOOD/URIC ACID: CPT | Performed by: EMERGENCY MEDICINE

## 2020-08-24 PROCEDURE — 99285 EMERGENCY DEPT VISIT HI MDM: CPT | Performed by: EMERGENCY MEDICINE

## 2020-08-24 PROCEDURE — 82550 ASSAY OF CK (CPK): CPT | Performed by: EMERGENCY MEDICINE

## 2020-08-24 PROCEDURE — 85379 FIBRIN DEGRADATION QUANT: CPT | Performed by: EMERGENCY MEDICINE

## 2020-08-24 PROCEDURE — 96374 THER/PROPH/DIAG INJ IV PUSH: CPT

## 2020-08-24 PROCEDURE — 36415 COLL VENOUS BLD VENIPUNCTURE: CPT | Performed by: EMERGENCY MEDICINE

## 2020-08-24 PROCEDURE — 86430 RHEUMATOID FACTOR TEST QUAL: CPT | Performed by: EMERGENCY MEDICINE

## 2020-08-24 PROCEDURE — 86141 C-REACTIVE PROTEIN HS: CPT | Performed by: EMERGENCY MEDICINE

## 2020-08-24 PROCEDURE — 85025 COMPLETE CBC W/AUTO DIFF WBC: CPT | Performed by: EMERGENCY MEDICINE

## 2020-08-24 PROCEDURE — 80053 COMPREHEN METABOLIC PANEL: CPT | Performed by: EMERGENCY MEDICINE

## 2020-08-24 PROCEDURE — 82553 CREATINE MB FRACTION: CPT | Performed by: EMERGENCY MEDICINE

## 2020-08-24 PROCEDURE — 73560 X-RAY EXAM OF KNEE 1 OR 2: CPT

## 2020-08-24 PROCEDURE — 73610 X-RAY EXAM OF ANKLE: CPT

## 2020-08-24 PROCEDURE — 86618 LYME DISEASE ANTIBODY: CPT | Performed by: EMERGENCY MEDICINE

## 2020-08-24 PROCEDURE — 99284 EMERGENCY DEPT VISIT MOD MDM: CPT

## 2020-08-24 RX ORDER — KETOROLAC TROMETHAMINE 30 MG/ML
10 INJECTION, SOLUTION INTRAMUSCULAR; INTRAVENOUS ONCE
Status: COMPLETED | OUTPATIENT
Start: 2020-08-24 | End: 2020-08-24

## 2020-08-24 RX ADMIN — KETOROLAC TROMETHAMINE 9.9 MG: 30 INJECTION, SOLUTION INTRAMUSCULAR at 23:26

## 2020-08-25 ENCOUNTER — APPOINTMENT (EMERGENCY)
Dept: ULTRASOUND IMAGING | Facility: HOSPITAL | Age: 75
End: 2020-08-25
Payer: MEDICARE

## 2020-08-25 VITALS
WEIGHT: 187.39 LBS | OXYGEN SATURATION: 97 % | SYSTOLIC BLOOD PRESSURE: 153 MMHG | RESPIRATION RATE: 16 BRPM | BODY MASS INDEX: 36.6 KG/M2 | HEART RATE: 60 BPM | TEMPERATURE: 98.3 F | DIASTOLIC BLOOD PRESSURE: 72 MMHG

## 2020-08-25 LAB
CK MB SERPL-MCNC: 1 NG/ML (ref 0–5)
CK MB SERPL-MCNC: <1 % (ref 0–2.5)
CK SERPL-CCNC: 140 U/L (ref 26–192)
CRP SERPL HS-MCNC: 1.91 MG/L
RHEUMATOID FACT SER QL LA: NEGATIVE
URATE SERPL-MCNC: 7.1 MG/DL (ref 2–6.8)

## 2020-08-25 PROCEDURE — 93971 EXTREMITY STUDY: CPT | Performed by: SURGERY

## 2020-08-25 PROCEDURE — 93971 EXTREMITY STUDY: CPT

## 2020-08-25 RX ORDER — OXYCODONE HYDROCHLORIDE AND ACETAMINOPHEN 5; 325 MG/1; MG/1
1 TABLET ORAL ONCE
Status: COMPLETED | OUTPATIENT
Start: 2020-08-25 | End: 2020-08-25

## 2020-08-25 RX ORDER — ALLOPURINOL 100 MG/1
100 TABLET ORAL DAILY
Qty: 20 TABLET | Refills: 0 | Status: SHIPPED | OUTPATIENT
Start: 2020-08-25

## 2020-08-25 RX ORDER — OXYCODONE HYDROCHLORIDE AND ACETAMINOPHEN 5; 325 MG/1; MG/1
1 TABLET ORAL EVERY 4 HOURS PRN
Qty: 20 TABLET | Refills: 0 | Status: SHIPPED | OUTPATIENT
Start: 2020-08-25 | End: 2020-08-29

## 2020-08-25 RX ADMIN — OXYCODONE HYDROCHLORIDE AND ACETAMINOPHEN 1 TABLET: 5; 325 TABLET ORAL at 00:16

## 2020-08-25 RX ADMIN — OXYCODONE HYDROCHLORIDE AND ACETAMINOPHEN 1 TABLET: 5; 325 TABLET ORAL at 05:56

## 2020-08-25 NOTE — ED PROVIDER NOTES
History  Chief Complaint   Patient presents with    Ankle Swelling     Pt reports bilateral ankle swelling with left ankle pain since saturday  Also Report left knee pain  Denies injury  Reports inability to put any weight on her left ankle or to ambulate   Knee Pain     Patient is a 76year old female with worsening left ankle pain and swelling and left knee pain and left lower leg pain since this past Saturday  Had prior R knee pain but this is better now  Last had aleve this afternoon without much relief  No trauma  No travel  No fever  No chest pain or sob  Patient worried about blood clot  No h/o DVT  No N/V  States she did not drive here  Was last seen in this ED on 11/26/17 for sciatica of right side  [facility] website checked on this patient and no Rx found   (+) difficulty ambulating due to pain  History provided by:  Patient and relative (son)   used: No    Ankle Swelling   Associated symptoms: no fever    Knee Pain   Associated symptoms: no fever        Prior to Admission Medications   Prescriptions Last Dose Informant Patient Reported? Taking? LISINOPRIL-HYDROCHLOROTHIAZIDE PO 8/24/2020 at Unknown time  Yes Yes   Sig: Take 1 tablet by mouth daily   PRAVASTATIN SODIUM PO 8/24/2020 at Unknown time  Yes Yes   Sig: Take by mouth daily   metoprolol tartrate (LOPRESSOR) 50 mg tablet 8/24/2020 at Unknown time  No Yes   Sig: Take 1 tablet (50 mg total) by mouth 2 (two) times a day   naproxen sodium (ANAPROX) 550 mg tablet 8/24/2020 at Unknown time  No Yes   Sig: Take 1 tablet (550 mg total) by mouth 2 (two) times a day with meals        Facility-Administered Medications: None       Past Medical History:   Diagnosis Date    Edema     B/L LE    Heart murmur     Hypertension     Left leg pain     Left-sided thoracic back pain     Lower leg pain     left    PONV (postoperative nausea and vomiting)     Recurrent dislocation of right shoulder     Right knee pain     Rotator cuff tear     left    Rotator cuff tear, right     Seasonal allergies     Shoulder dislocation     Right x5    Varicose vein of leg     B/L       Past Surgical History:   Procedure Laterality Date    COLONOSCOPY      4/11 mild diverticulosis    INCISIONAL BREAST BIOPSY      KNEE ARTHROSCOPY Left     [place] SHLDR ARTHROSCOP,SURG,W/ROTAT CUFF REPR Right 8/24/2016    Procedure: SHOULDER ARTHROSCOPY WITH REPAIR ROTATOR CUFF  ;  Surgeon: Brittny Colindres MD;  [facility];  Service: Orthopedics    TUBAL LIGATION         Family History   Problem Relation Age of Onset    Stroke Mother     Stomach cancer Father     Colon cancer Sister     Hypertension Sister     Hypertension Brother     Hypertension Son     Uterine cancer Family      I have reviewed and agree with the history as documented  E-Cigarette/Vaping    E-Cigarette Use Never User      E-Cigarette/Vaping Substances    Nicotine No     THC No     CBD No      Social History     Tobacco Use    Smoking status: Never Smoker    Smokeless tobacco: Never Used   Substance Use Topics    Alcohol use: Yes     Comment: socially    Drug use: No       Review of Systems   Constitutional: Negative for fever  Respiratory: Negative for shortness of breath  Cardiovascular: Negative for chest pain  Gastrointestinal: Negative for nausea and vomiting  Musculoskeletal: Positive for arthralgias, joint swelling and myalgias  All other systems reviewed and are negative  Physical Exam  Physical Exam  Vitals signs and nursing note reviewed  Constitutional:       General: She is in acute distress (moderate)  Appearance: She is not toxic-appearing  HENT:      Head: Normocephalic and atraumatic  Mouth/Throat:      Mouth: Mucous membranes are moist       Pharynx: No posterior oropharyngeal erythema  Eyes:      General: No scleral icterus  Neck:      Musculoskeletal: Normal range of motion and neck supple  No neck rigidity  Cardiovascular:      Rate and Rhythm: Normal rate and regular rhythm  Pulses: Normal pulses  Heart sounds: Normal heart sounds  No murmur  Pulmonary:      Effort: Pulmonary effort is normal  No respiratory distress  Breath sounds: Normal breath sounds  Abdominal:      General: Bowel sounds are normal       Palpations: Abdomen is soft  Tenderness: There is no abdominal tenderness  Musculoskeletal:         General: Swelling (left ankle bilaterally and left knee) and tenderness (left lateral ankle and anterior L knee) present  No signs of injury  Right lower leg: Edema (mild) present  Left lower leg: Edema (mild) present  Skin:     General: Skin is warm and dry  Findings: No bruising, erythema or rash  Neurological:      General: No focal deficit present  Mental Status: She is alert and oriented to person, place, and time     Psychiatric:         Mood and Affect: Mood normal          Vital Signs  ED Triage Vitals [08/24/20 2257]   Temperature Pulse Respirations Blood Pressure SpO2   98 3 °F (36 8 °C) 84 18 (!) 213/91 100 %      Temp Source Heart Rate Source Patient Position - Orthostatic VS BP Location FiO2 (%)   Oral Monitor Lying Right arm --      Pain Score       9           Vitals:    08/24/20 2330 08/25/20 0000 08/25/20 0045 08/25/20 0500   BP: 162/96 158/71  144/61   Pulse: 76 68 84 (!) 54   Patient Position - Orthostatic VS: Sitting Sitting           Visual Acuity      ED Medications  Medications   ketorolac (TORADOL) injection 9 9 mg (9 9 mg Intravenous Given 8/24/20 2326)   oxyCODONE-acetaminophen (PERCOCET) 5-325 mg per tablet 1 tablet (1 tablet Oral Given 8/25/20 0016)   oxyCODONE-acetaminophen (PERCOCET) 5-325 mg per tablet 1 tablet (1 tablet Oral Given 8/25/20 0556)       Diagnostic Studies  Results Reviewed     Procedure Component Value Units Date/Time    Uric acid [[id number]]  (Abnormal) Collected:  08/24/20 2317    Lab Status:  Final result Specimen: Blood from Arm, Right Updated:  08/25/20 0000     Uric Acid 7 1 mg/dL     High sensitivity CRP [[id number]] Collected:  08/24/20 2317    Lab Status:  Final result Specimen:  Blood from Arm, Right Updated:  08/25/20 0000     CRP, High Sensitivity 1 91 mg/L     Narrative:             HsCRP Level       Relative Risk           <1 0 mg/L          Low           1 0 to 3 0 mg/L    Average           >3 0 mg/L          High        CKMB [[id number]]  (Normal) Collected:  08/24/20 2317    Lab Status:  Final result Specimen:  Blood from Arm, Right Updated:  08/25/20 0000     CK-MB Index <1 0 %      CK-MB 1 0 ng/mL     CK Total with Reflex CKMB [[id number]]  (Normal) Collected:  08/24/20 2317    Lab Status:  Final result Specimen:  Blood from Arm, Right Updated:  08/25/20 0000     Total  U/L     D-Dimer [[id number]]  (Abnormal) Collected:  08/24/20 2317    Lab Status:  Final result Specimen:  Blood from Arm, Right Updated:  08/24/20 2359     D-Dimer, Quant 1 01 ug/ml FEU     Comprehensive metabolic panel [[id number]]  (Abnormal) Collected:  08/24/20 2317    Lab Status:  Final result Specimen:  Blood from Arm, Right Updated:  08/24/20 2340     Sodium 142 mmol/L      Potassium 3 4 mmol/L      Chloride 106 mmol/L      CO2 28 mmol/L      ANION GAP 8 mmol/L      BUN 19 mg/dL      Creatinine 0 98 mg/dL      Glucose 116 mg/dL      Calcium 9 2 mg/dL      AST 19 U/L      ALT 26 U/L      Alkaline Phosphatase 93 U/L      Total Protein 7 6 g/dL      Albumin 4 0 g/dL      Total Bilirubin 0 32 mg/dL      eGFR 65 ml/min/1 73sq m     Narrative:       [place] guidelines for Chronic Kidney Disease (CKD):     Stage 1 with normal or high GFR (GFR > 90 mL/min/1 73 square meters)    Stage 2 Mild CKD (GFR = 60-89 mL/min/1 73 square meters)    Stage 3A Moderate CKD (GFR = 45-59 mL/min/1 73 square meters)    Stage 3B Moderate CKD (GFR = 30-44 mL/min/1 73 square meters)    Stage 4 Severe CKD (GFR = 15-29 mL/min/1 73 square meters)    Stage 5 End Stage CKD (GFR <15 mL/min/1 73 square meters)  Note: GFR calculation is accurate only with a steady state creatinine    CBC and differential [[id number]] Collected:  08/24/20 2317    Lab Status:  Final result Specimen:  Blood from Arm, Right Updated:  08/24/20 2324     WBC 5 50 Thousand/uL      RBC 4 45 Million/uL      Hemoglobin 12 7 g/dL      Hematocrit 38 9 %      MCV 87 fL      MCH 28 5 pg      MCHC 32 6 g/dL      RDW 14 3 %      MPV 9 8 fL      Platelets 601 Thousands/uL      nRBC 0 /100 WBCs      Neutrophils Relative 47 %      Immat GRANS % 0 %      Lymphocytes Relative 41 %      Monocytes Relative 9 %      Eosinophils Relative 2 %      Basophils Relative 1 %      Neutrophils Absolute 2 59 Thousands/µL      Immature Grans Absolute 0 01 Thousand/uL      Lymphocytes Absolute 2 26 Thousands/µL      Monocytes Absolute 0 51 Thousand/µL      Eosinophils Absolute 0 09 Thousand/µL      Basophils Absolute 0 04 Thousands/µL     Rheumatoid factor screen [[id number]] Collected:  08/24/20 2317    Lab Status: In process Specimen:  Blood from Arm, Right Updated:  08/24/20 2321    Lyme Antibody Profile with reflex to [facility] [[id number]] Collected:  08/24/20 2317    Lab Status: In process Specimen:  Blood from Arm, Right Updated:  08/24/20 2321    CYN Screen w/ Reflex to Titer/Pattern [[id number]] Collected:  08/24/20 2317    Lab Status: In process Specimen:  Blood from Arm, Right Updated:  08/24/20 2321                 XR ankle 3+ views LEFT   ED Interpretation by Azra Severino MD (08/24 2334)   DJD and no fx or dislocation read by me  XR knee 1 or 2 vw left   ED Interpretation by Azra Severino MD (08/24 2334)   DJD and no fx or dislocation read by me          by Sahara Mcgowan (08/24 2330)      VAS lower limb venous duplex study, unilateral/limited    (Results Pending)              Procedures  Procedures         ED Course  ED Course as of Aug 25 0707   Mon Aug 24, 2020   2337 X-rays d/w patient and son with patient's permission  Tue Aug 25, 2020   0012 Labs d/w patient and son  Percocet ordered since patient still has pain  0013 Doppler US ordered for AM study to rule out DVT  D-Dimer, Quant(!): 1 01   0014 BP improving  1012 Patient requesting pain medication for her knee and ankle pain so more percocet ordered  0701 No DVT as per US tech  VAS lower limb venous duplex study, unilateral/limited   0701 US d/w patient  Has own cane at home  Just moved back to the area from [address] [place] [address]  US AUDIT      Most Recent Value   Initial Alcohol Screen: US AUDIT-C    1  How often do you have a drink containing alcohol? 2 Filed at: 08/24/2020 2258   2  How many drinks containing alcohol do you have on a typical day you are drinking? 1 Filed at: 08/24/2020 2258   3b  FEMALE Any Age, or MALE 65+: How often do you have 4 or more drinks on one occassion? 0 Filed at: 08/24/2020 2320   Audit-C Score  0 Filed at: 08/24/2020 2320                  AUBREY/DAST-10      Most Recent Value   How many times in the past year have you    Used an illegal drug or used a prescription medication for non-medical reasons? Never Filed at: 08/24/2020 2259                                MDM  Number of Diagnoses or Management Options  Diagnosis management comments: Differential diagnosis including but not limited to: Gout, arthritis, Lyme disease, Lupus, rheumatoid arthritis, cellulitis, bursitis, tendinitis, dislocation, fracture, sprain, strain, DVT, Baker's cyst; doubt septic arthritis or arterial occlusion          Amount and/or Complexity of Data Reviewed  Clinical lab tests: ordered and reviewed  Tests in the radiology section of CPT®: ordered and reviewed  Decide to obtain previous medical records or to obtain history from someone other than the patient: yes  Review and summarize past medical records: yes  Independent visualization of images, tracings, or specimens: yes          Disposition  Final diagnoses:   Acute pain of left knee   Left ankle pain   Left ankle swelling   Elevated blood uric acid level     Time reflects when diagnosis was documented in both MDM as applicable and the Disposition within this note     Time User Action Codes Description Comment    8/25/2020 12:13 AM Launie Healy Add [M25 562] Acute pain of left knee     8/25/2020 12:13 AM Launie Healy Add [M25 572] Left ankle pain     8/25/2020 12:13 AM Launie Healy Add [B04 [id number]] Left ankle swelling     8/25/2020 12:14 AM Launie Healy Add [E79 0] Elevated blood uric acid level       ED Disposition     ED Disposition Condition Date/Time Comment    Discharge Stable Tue Aug 25, 2020  7:05 AM Marlene Tan discharge to home/self care  Follow-up Information     Follow up With Specialties Details Why Contact Info    [employer]  Call in 1 day Return sooner if increased pain, swelling, fever, vomiting, rash, redness, numbness, weakness  No driving with percocet  Do not use acetaminophen with percocet  [facility]  [phone]            Patient's Medications   Discharge Prescriptions    ALLOPURINOL (ZYLOPRIM) 100 MG TABLET    Take 1 tablet (100 mg total) by mouth daily       Start Date: 8/25/2020 End Date: --       Order Dose: 100 mg       Quantity: 20 tablet    Refills: 0    OXYCODONE-ACETAMINOPHEN (PERCOCET) 5-325 MG PER TABLET    Take 1 tablet by mouth every 4 (four) hours as needed for moderate pain for up to 4 daysMax Daily Amount: 6 tablets       Start Date: 8/25/2020 End Date: 8/29/2020       Order Dose: 1 tablet       Quantity: 20 tablet    Refills: 0     No discharge procedures on file      PDMP Review       Value Time User    PDMP Reviewed  Yes 8/24/2020 10:52 PM Juan Carlos Escobedo MD          ED Provider  Electronically Signed by           Juan Carlos Escobedo MD  08/25/20 3449

## 2020-08-26 LAB
B BURGDOR IGG+IGM SER-ACNC: <0.91 ISR (ref 0–0.9)
RYE IGE QN: NEGATIVE

## 2020-08-31 ENCOUNTER — APPOINTMENT (EMERGENCY)
Dept: CT IMAGING | Facility: HOSPITAL | Age: 75
End: 2020-08-31
Payer: MEDICARE

## 2020-08-31 ENCOUNTER — HOSPITAL ENCOUNTER (EMERGENCY)
Facility: HOSPITAL | Age: 75
Discharge: HOME | End: 2020-08-31
Attending: EMERGENCY MEDICINE | Admitting: EMERGENCY MEDICINE
Payer: MEDICARE

## 2020-08-31 VITALS
RESPIRATION RATE: 16 BRPM | TEMPERATURE: 98.3 F | OXYGEN SATURATION: 100 % | BODY MASS INDEX: 35.92 KG/M2 | HEIGHT: 60 IN | SYSTOLIC BLOOD PRESSURE: 147 MMHG | WEIGHT: 182.98 LBS | DIASTOLIC BLOOD PRESSURE: 67 MMHG | HEART RATE: 57 BPM

## 2020-08-31 DIAGNOSIS — M19.90 OSTEOARTHRITIS: Primary | ICD-10-CM

## 2020-08-31 LAB
ALBUMIN SERPL BCP-MCNC: 3.9 G/DL (ref 3.5–5)
ALP SERPL-CCNC: 103 U/L (ref 46–116)
ALT SERPL W P-5'-P-CCNC: 23 U/L (ref 12–78)
ANION GAP SERPL CALCULATED.3IONS-SCNC: 7 MMOL/L (ref 4–13)
APTT PPP: 40 SECONDS (ref 23–37)
AST SERPL W P-5'-P-CCNC: 16 U/L (ref 5–45)
BASOPHILS # BLD AUTO: 0.04 THOUSANDS/ΜL (ref 0–0.1)
BASOPHILS NFR BLD AUTO: 1 % (ref 0–1)
BILIRUB SERPL-MCNC: 0.31 MG/DL (ref 0.2–1)
BUN SERPL-MCNC: 22 MG/DL (ref 5–25)
CALCIUM SERPL-MCNC: 9.4 MG/DL (ref 8.3–10.1)
CHLORIDE SERPL-SCNC: 101 MMOL/L (ref 100–108)
CK MB SERPL-MCNC: 1.3 NG/ML (ref 0–5)
CK MB SERPL-MCNC: <1 % (ref 0–2.5)
CK SERPL-CCNC: 145 U/L (ref 26–192)
CO2 SERPL-SCNC: 28 MMOL/L (ref 21–32)
CREAT SERPL-MCNC: 0.93 MG/DL (ref 0.6–1.3)
CRP SERPL HS-MCNC: 11.76 MG/L
EOSINOPHIL # BLD AUTO: 0.03 THOUSAND/ΜL (ref 0–0.61)
EOSINOPHIL NFR BLD AUTO: 1 % (ref 0–6)
ERYTHROCYTE [DISTWIDTH] IN BLOOD BY AUTOMATED COUNT: 14.5 % (ref 11.6–15.1)
GFR SERPL CREATININE-BSD FRML MDRD: 70 ML/MIN/1.73SQ M
GLUCOSE SERPL-MCNC: 116 MG/DL (ref 65–140)
HCT VFR BLD AUTO: 39.2 % (ref 34.8–46.1)
HGB BLD-MCNC: 12.6 G/DL (ref 11.5–15.4)
IMM GRANULOCYTES # BLD AUTO: 0.01 THOUSAND/UL (ref 0–0.2)
IMM GRANULOCYTES NFR BLD AUTO: 0 % (ref 0–2)
INR PPP: 1.15 (ref 0.84–1.19)
LYMPHOCYTES # BLD AUTO: 1.35 THOUSANDS/ΜL (ref 0.6–4.47)
LYMPHOCYTES NFR BLD AUTO: 21 % (ref 14–44)
MAGNESIUM SERPL-MCNC: 2.1 MG/DL (ref 1.6–2.6)
MCH RBC QN AUTO: 28.3 PG (ref 26.8–34.3)
MCHC RBC AUTO-ENTMCNC: 32.1 G/DL (ref 31.4–37.4)
MCV RBC AUTO: 88 FL (ref 82–98)
MONOCYTES # BLD AUTO: 0.53 THOUSAND/ΜL (ref 0.17–1.22)
MONOCYTES NFR BLD AUTO: 8 % (ref 4–12)
NEUTROPHILS # BLD AUTO: 4.41 THOUSANDS/ΜL (ref 1.85–7.62)
NEUTS SEG NFR BLD AUTO: 69 % (ref 43–75)
NRBC BLD AUTO-RTO: 0 /100 WBCS
PLATELET # BLD AUTO: 223 THOUSANDS/UL (ref 149–390)
PMV BLD AUTO: 9.7 FL (ref 8.9–12.7)
POTASSIUM SERPL-SCNC: 3.8 MMOL/L (ref 3.5–5.3)
PROT SERPL-MCNC: 7.7 G/DL (ref 6.4–8.2)
PROTHROMBIN TIME: 14.8 SECONDS (ref 11.6–14.5)
RBC # BLD AUTO: 4.45 MILLION/UL (ref 3.81–5.12)
SODIUM SERPL-SCNC: 136 MMOL/L (ref 136–145)
URATE SERPL-MCNC: 4.8 MG/DL (ref 2–6.8)
WBC # BLD AUTO: 6.37 THOUSAND/UL (ref 4.31–10.16)

## 2020-08-31 PROCEDURE — 99284 EMERGENCY DEPT VISIT MOD MDM: CPT | Performed by: PHYSICIAN ASSISTANT

## 2020-08-31 PROCEDURE — 96374 THER/PROPH/DIAG INJ IV PUSH: CPT

## 2020-08-31 PROCEDURE — 83735 ASSAY OF MAGNESIUM: CPT | Performed by: PHYSICIAN ASSISTANT

## 2020-08-31 PROCEDURE — 84550 ASSAY OF BLOOD/URIC ACID: CPT | Performed by: PHYSICIAN ASSISTANT

## 2020-08-31 PROCEDURE — 82550 ASSAY OF CK (CPK): CPT | Performed by: PHYSICIAN ASSISTANT

## 2020-08-31 PROCEDURE — 85025 COMPLETE CBC W/AUTO DIFF WBC: CPT | Performed by: PHYSICIAN ASSISTANT

## 2020-08-31 PROCEDURE — 99284 EMERGENCY DEPT VISIT MOD MDM: CPT

## 2020-08-31 PROCEDURE — 80053 COMPREHEN METABOLIC PANEL: CPT | Performed by: PHYSICIAN ASSISTANT

## 2020-08-31 PROCEDURE — 85730 THROMBOPLASTIN TIME PARTIAL: CPT | Performed by: PHYSICIAN ASSISTANT

## 2020-08-31 PROCEDURE — 36415 COLL VENOUS BLD VENIPUNCTURE: CPT | Performed by: PHYSICIAN ASSISTANT

## 2020-08-31 PROCEDURE — 85610 PROTHROMBIN TIME: CPT | Performed by: PHYSICIAN ASSISTANT

## 2020-08-31 PROCEDURE — 82553 CREATINE MB FRACTION: CPT | Performed by: PHYSICIAN ASSISTANT

## 2020-08-31 PROCEDURE — 86141 C-REACTIVE PROTEIN HS: CPT | Performed by: PHYSICIAN ASSISTANT

## 2020-08-31 PROCEDURE — 73701 CT LOWER EXTREMITY W/DYE: CPT

## 2020-08-31 RX ORDER — KETOROLAC TROMETHAMINE 30 MG/ML
9.9 INJECTION, SOLUTION INTRAMUSCULAR; INTRAVENOUS ONCE
Status: COMPLETED | OUTPATIENT
Start: 2020-08-31 | End: 2020-08-31

## 2020-08-31 RX ORDER — OXYCODONE HYDROCHLORIDE AND ACETAMINOPHEN 5; 325 MG/1; MG/1
1 TABLET ORAL ONCE
Status: COMPLETED | OUTPATIENT
Start: 2020-08-31 | End: 2020-08-31

## 2020-08-31 RX ORDER — OXYCODONE HYDROCHLORIDE AND ACETAMINOPHEN 5; 325 MG/1; MG/1
1 TABLET ORAL EVERY 6 HOURS PRN
Qty: 8 TABLET | Refills: 0 | Status: SHIPPED | OUTPATIENT
Start: 2020-08-31

## 2020-08-31 RX ADMIN — OXYCODONE HYDROCHLORIDE AND ACETAMINOPHEN 1 TABLET: 5; 325 TABLET ORAL at 03:30

## 2020-08-31 RX ADMIN — KETOROLAC TROMETHAMINE 9.9 MG: 30 INJECTION, SOLUTION INTRAMUSCULAR at 03:30

## 2020-08-31 RX ADMIN — IOHEXOL 100 ML: 350 INJECTION, SOLUTION INTRAVENOUS at 04:24

## 2020-08-31 NOTE — DISCHARGE INSTRUCTIONS
CT left knee with IV contrast     INDICATION: Left lower extremity pain  COMPARISON: None  TECHNIQUE: CT examination of the left knee was performed  This examination, like all CT scans performed in the [facility], was performed utilizing techniques to minimize radiation dose exposure, including the use of iterative   reconstruction and automated exposure control software  Sagittal and coronal two dimensional reconstructed images were also submitted for interpretation  IV Contrast: 100 mL of iohexol (OMNIPAQUE)  was administered intravenously without immediate adverse reaction  Rad dose  264 mGy-cm      FINDINGS:     OSSEOUS STRUCTURES:  No acute fracture or dislocation  There is near bone-on-bone apposition of the lateral compartment with large marginal osteophytes and scalloping of the articular surface of the tibial plateau resulting in valgus deformity  There   is moderate joint space narrowing of the medial compartment with large marginal osteophytes  Mild joint space narrowing of the patellofemoral compartment with large marginal osteophytes  Small suprapatellar joint effusion  Mild chondrocalcinosis  Small calcifications are seen posterior to the medial femoral condyle which may be due to small loose bodies  VISUALIZED MUSCULATURE:  Unremarkable  SOFT TISSUES:  There is a 4 2 x 2 4 x 6 6 cm complex popliteal cyst containing low density cartilaginous debris  OTHER PERTINENT FINDINGS:  None  IMPRESSION:     1  No acute fracture or dislocation  2   Severe osteoarthritis of predominantly the lateral compartment  Small suprapatellar joint effusion    3   4 2 x 2 4 x 6 6 cm complex popliteal cyst         Workstation performed: [id number]    Take Percocet as indicated  Follow-up with PCP  Follow-up with Orthopedics  Follow-up with emergency department symptoms persist or exacerbate

## 2020-08-31 NOTE — ED PROVIDER NOTES
History  Chief Complaint   Patient presents with    Leg Pain     Pt presents to ED from home w/ left leg pain, w/o injury for over a week  Pt dx w/ gout, meds not helping  Patient is a 80-year-old female with history bilateral lower extremity edema, hypertension, left leg pain left knee arthroscopically that presents emergency department with persistent dull and achy nonradiating left lower knee pain for 10 days  Patient denies associated symptomatology  Patient was recently seen emergency department on 08/24/2020 for acute pain in left knee, left ankle pain, left ankle swelling, elevated uric acid blood level; with elevated uric acid elevated D-dimer with left knee x-ray denoting DJD, and left ankle with no acute osseous abnormality "ossification at the insertion of Achilles tendon ultrasound negative for DVT and thrombophlebitis, with negative leukocytosis, and discharged home care prescriptions allopurinol and Percocet  Patient returns emergency department with symptoms similar to those experienced at time of evaluation in the ED/20 04/2020  Patient verbalizes that he is getting increasingly difficult to ambulate with the left lower extremity with knee pain prevailing  Patient denies new skin rashes and skin changes over left knee  Patient denies palliative factors with provocative factors of pressure to left knee and weight-bearing on left lower extremity  Patient denies sick contacts or recent travel  Patient denies recent fall or recent trauma  Patient denies chest pain, shortness of breath, and abdominal pain        History provided by:  Patient   used: No    Leg Pain   Location:  Knee  Time since incident:  10 days  Injury: no    Knee location:  L knee  Pain details:     Quality:  Aching    Radiates to:  L leg    Severity:  Moderate    Onset quality:  Gradual    Duration:  10 days    Timing:  Constant    Progression:  Worsening  Chronicity:  Recurrent  Foreign body present: No foreign bodies  Prior injury to area:  Unable to specify  Relieved by:  Nothing  Worsened by:  Bearing weight, extension and flexion  Ineffective treatments:  None tried (narcotic medications)  Associated symptoms: decreased ROM    Associated symptoms: no back pain, no fatigue, no fever, no itching, no muscle weakness, no neck pain, no numbness, no stiffness, no swelling and no tingling        Prior to Admission Medications   Prescriptions Last Dose Informant Patient Reported? Taking? LISINOPRIL-HYDROCHLOROTHIAZIDE PO   Yes No   Sig: Take 1 tablet by mouth daily   PRAVASTATIN SODIUM PO   Yes No   Sig: Take by mouth daily   allopurinol (ZYLOPRIM) 100 mg tablet   No No   Sig: Take 1 tablet (100 mg total) by mouth daily   metoprolol tartrate (LOPRESSOR) 50 mg tablet   No No   Sig: Take 1 tablet (50 mg total) by mouth 2 (two) times a day   naproxen sodium (ANAPROX) 550 mg tablet   No No   Sig: Take 1 tablet (550 mg total) by mouth 2 (two) times a day with meals        Facility-Administered Medications: None       Past Medical History:   Diagnosis Date    Edema     B/L LE    Heart murmur     Hypertension     Left leg pain     Left-sided thoracic back pain     Lower leg pain     left    PONV (postoperative nausea and vomiting)     Recurrent dislocation of right shoulder     Right knee pain     Rotator cuff tear     left    Rotator cuff tear, right     Seasonal allergies     Shoulder dislocation     Right x5    Varicose vein of leg     B/L       Past Surgical History:   Procedure Laterality Date    COLONOSCOPY      4/11 mild diverticulosis    INCISIONAL BREAST BIOPSY      KNEE ARTHROSCOPY Left     [place] SHLDR ARTHROSCOP,SURG,W/ROTAT CUFF REPR Right 8/24/2016    Procedure: SHOULDER ARTHROSCOPY WITH REPAIR ROTATOR CUFF  ;  Surgeon: Gail Nash MD;  Location: [facility];  Service: Orthopedics    TUBAL LIGATION         Family History   Problem Relation Age of Onset    Stroke Mother     Stomach cancer Father     Colon cancer Sister     Hypertension Sister     Hypertension Brother     Hypertension Son     Uterine cancer Family      I have reviewed and agree with the history as documented  E-Cigarette/Vaping    E-Cigarette Use Never User      E-Cigarette/Vaping Substances    Nicotine No     THC No     CBD No      Social History     Tobacco Use    Smoking status: Never Smoker    Smokeless tobacco: Never Used   Substance Use Topics    Alcohol use: Yes     Comment: socially    Drug use: No       Review of Systems   Constitutional: Negative for activity change, appetite change, chills, fatigue and fever  HENT: Negative for congestion, postnasal drip, rhinorrhea, sinus pressure, sinus pain, sore throat and tinnitus  Eyes: Negative for photophobia and visual disturbance  Respiratory: Negative for cough, chest tightness and shortness of breath  Cardiovascular: Negative for chest pain and palpitations  Gastrointestinal: Negative for abdominal pain, constipation, diarrhea, nausea and vomiting  Genitourinary: Negative for difficulty urinating, dysuria, flank pain, frequency and urgency  Musculoskeletal: Positive for arthralgias  Negative for back pain, gait problem, neck pain, neck stiffness and stiffness  Skin: Negative for itching, pallor and rash  Allergic/Immunologic: Negative for environmental allergies and food allergies  Neurological: Negative for dizziness, weakness, numbness and headaches  Psychiatric/Behavioral: Negative for confusion  All other systems reviewed and are negative  Physical Exam  Physical Exam  Vitals signs and nursing note reviewed  Constitutional:       General: She is awake  Appearance: Normal appearance  She is well-developed  She is not ill-appearing, toxic-appearing or diaphoretic        Comments: /67   Pulse 57   Temp 98 3 °F (36 8 °C) (Oral)   Resp 16   Ht 5' (1 524 m)   Wt 83 kg (182 lb 15 7 oz)   SpO2 100%   BMI 35 74 kg/m² HENT:      Head: Normocephalic and atraumatic  Right Ear: Hearing and external ear normal  No decreased hearing noted  No drainage, swelling or tenderness  No mastoid tenderness  Left Ear: Hearing and external ear normal  No decreased hearing noted  No drainage, swelling or tenderness  No mastoid tenderness  Nose: Nose normal       Mouth/Throat:      Lips: Pink  Mouth: Mucous membranes are moist       Pharynx: Oropharynx is clear  Uvula midline  Eyes:      General: Lids are normal  Vision grossly intact  Right eye: No discharge  Left eye: No discharge  Extraocular Movements: Extraocular movements intact  Conjunctiva/sclera: Conjunctivae normal       Pupils: Pupils are equal, round, and reactive to light  Neck:      Musculoskeletal: Full passive range of motion without pain, normal range of motion and neck supple  Normal range of motion  No neck rigidity, spinous process tenderness or muscular tenderness  Vascular: No JVD  Trachea: Trachea and phonation normal  No tracheal tenderness or tracheal deviation  Cardiovascular:      Rate and Rhythm: Normal rate and regular rhythm  Pulses: Normal pulses  Radial pulses are 2+ on the right side and 2+ on the left side  Dorsalis pedis pulses are 2+ on the right side and 2+ on the left side  Posterior tibial pulses are 2+ on the right side and 2+ on the left side  Heart sounds: Normal heart sounds  Pulmonary:      Effort: Pulmonary effort is normal       Breath sounds: Normal breath sounds  No stridor  No decreased breath sounds, wheezing, rhonchi or rales  Chest:      Chest wall: No tenderness  Abdominal:      General: Abdomen is flat  Bowel sounds are normal  There is no distension  Palpations: Abdomen is soft  Abdomen is not rigid  Tenderness: There is no abdominal tenderness  There is no guarding or rebound     Musculoskeletal:      Left knee: She exhibits decreased range of motion, effusion and bony tenderness  She exhibits no ecchymosis, no deformity, no laceration and normal patellar mobility  Tenderness found  Lateral joint line tenderness noted  Comments: Passive ROM intact  Upper extremity 4/5 bilaterally  Right lower extremity 4/5  Left lower extremity, hip 4/5, knee 3/5, ankle 4/5  Neurovascularly intact  No grinding or clicking of joints     Lymphadenopathy:      Head:      Right side of head: No submental, submandibular, tonsillar, preauricular, posterior auricular or occipital adenopathy  Left side of head: No submental, submandibular, tonsillar, preauricular, posterior auricular or occipital adenopathy  Cervical: No cervical adenopathy  Right cervical: No superficial, deep or posterior cervical adenopathy  Left cervical: No superficial, deep or posterior cervical adenopathy  Skin:     General: Skin is warm  Capillary Refill: Capillary refill takes less than 2 seconds  Neurological:      General: No focal deficit present  Mental Status: She is alert and oriented to person, place, and time  GCS: GCS eye subscore is 4  GCS verbal subscore is 5  GCS motor subscore is 6  Sensory: No sensory deficit  Deep Tendon Reflexes: Reflexes are normal and symmetric  Reflex Scores:       Patellar reflexes are 2+ on the right side and 2+ on the left side  Psychiatric:         Mood and Affect: Mood normal          Speech: Speech normal          Behavior: Behavior normal  Behavior is cooperative  Thought Content:  Thought content normal          Judgment: Judgment normal          Vital Signs  ED Triage Vitals   Temperature Pulse Respirations Blood Pressure SpO2   08/31/20 0226 08/31/20 0226 08/31/20 0226 08/31/20 0228 08/31/20 0226   98 3 °F (36 8 °C) 71 16 138/69 97 %      Temp Source Heart Rate Source Patient Position - Orthostatic VS BP Location FiO2 (%)   08/31/20 0226 08/31/20 0226 -- -- --   Oral Monitor Pain Score       08/31/20 0226       Worst Possible Pain           Vitals:    08/31/20 0226 08/31/20 0228 08/31/20 0503   BP:  138/69 147/67   Pulse: 71  57         Visual Acuity      ED Medications  Medications   oxyCODONE-acetaminophen (PERCOCET) 5-325 mg per tablet 1 tablet (1 tablet Oral Given 8/31/20 0330)   ketorolac (TORADOL) injection 9 9 mg (9 9 mg Intravenous Given 8/31/20 0330)   iohexol (OMNIPAQUE) 350 MG/ML injection (MULTI-DOSE) 100 mL (100 mL Intravenous Given 8/31/20 0424)       Diagnostic Studies  Results Reviewed     Procedure Component Value Units Date/Time    Magnesium [[id number]]  (Normal) Collected:  08/31/20 0320    Lab Status:  Final result Specimen:  Blood from Arm, Right Updated:  08/31/20 0422     Magnesium 2 1 mg/dL     High sensitivity CRP [[id number]] Collected:  08/31/20 0320    Lab Status:  Final result Specimen:  Blood from Arm, Right Updated:  08/31/20 0411     CRP, High Sensitivity 11 76 mg/L     Narrative:             HsCRP Level       Relative Risk           <1 0 mg/L          Low           1 0 to 3 0 mg/L    Average           >3 0 mg/L          High        Uric acid [[id number]]  (Normal) Collected:  08/31/20 0320    Lab Status:  Final result Specimen:  Blood from Arm, Right Updated:  08/31/20 0407     Uric Acid 4 8 mg/dL     CKMB [[id number]]  (Normal) Collected:  08/31/20 0320    Lab Status:  Final result Specimen:  Blood from Arm, Right Updated:  08/31/20 0407     CK-MB Index <1 0 %      CK-MB 1 3 ng/mL     CK Total with Reflex CKMB [[id number]]  (Normal) Collected:  08/31/20 0320    Lab Status:  Final result Specimen:  Blood from Arm, Right Updated:  08/31/20 0406     Total  U/L     Comprehensive metabolic panel [[id number]] Collected:  08/31/20 0320    Lab Status:  Final result Specimen:  Blood from Arm, Right Updated:  08/31/20 0347     Sodium 136 mmol/L      Potassium 3 8 mmol/L      Chloride 101 mmol/L      CO2 28 mmol/L      ANION GAP 7 mmol/L      BUN 22 mg/dL Creatinine 0 93 mg/dL      Glucose 116 mg/dL      Calcium 9 4 mg/dL      AST 16 U/L      ALT 23 U/L      Alkaline Phosphatase 103 U/L      Total Protein 7 7 g/dL      Albumin 3 9 g/dL      Total Bilirubin 0 31 mg/dL      eGFR 70 ml/min/1 73sq m     Narrative:       National Kidney Disease Foundation guidelines for Chronic Kidney Disease (CKD):     Stage 1 with normal or high GFR (GFR > 90 mL/min/1 73 square meters)    Stage 2 Mild CKD (GFR = 60-89 mL/min/1 73 square meters)    Stage 3A Moderate CKD (GFR = 45-59 mL/min/1 73 square meters)    Stage 3B Moderate CKD (GFR = 30-44 mL/min/1 73 square meters)    Stage 4 Severe CKD (GFR = 15-29 mL/min/1 73 square meters)    Stage 5 End Stage CKD (GFR <15 mL/min/1 73 square meters)  Note: GFR calculation is accurate only with a steady state creatinine    Protime-INR [[id number]]  (Abnormal) Collected:  08/31/20 0320    Lab Status:  Final result Specimen:  Blood from Arm, Right Updated:  08/31/20 0342     Protime 14 8 seconds      INR 1 15    APTT [[id number]]  (Abnormal) Collected:  08/31/20 0320    Lab Status:  Final result Specimen:  Blood from Arm, Right Updated:  08/31/20 0342     PTT 40 seconds     CBC and differential [[id number]] Collected:  08/31/20 0320    Lab Status:  Final result Specimen:  Blood from Arm, Right Updated:  08/31/20 0329     WBC 6 37 Thousand/uL      RBC 4 45 Million/uL      Hemoglobin 12 6 g/dL      Hematocrit 39 2 %      MCV 88 fL      MCH 28 3 pg      MCHC 32 1 g/dL      RDW 14 5 %      MPV 9 7 fL      Platelets 900 Thousands/uL      nRBC 0 /100 WBCs      Neutrophils Relative 69 %      Immat GRANS % 0 %      Lymphocytes Relative 21 %      Monocytes Relative 8 %      Eosinophils Relative 1 %      Basophils Relative 1 %      Neutrophils Absolute 4 41 Thousands/µL      Immature Grans Absolute 0 01 Thousand/uL      Lymphocytes Absolute 1 35 Thousands/µL      Monocytes Absolute 0 53 Thousand/µL      Eosinophils Absolute 0 03 Thousand/µL Basophils Absolute 0 04 Thousands/µL                  CT lower extremity w contrast left   ED Interpretation by Arpita Hooks PA-C (08/31 0500)   Meghan Velez MD  [phone]  8/31/2020      Narrative & Impression     CT left knee with IV contrast     INDICATION: Left lower extremity pain      COMPARISON: None      TECHNIQUE: CT examination of the left knee was performed  This examination, like all CT scans performed in the [place], was performed utilizing techniques to minimize radiation dose exposure, including the use of iterative   reconstruction and automated exposure control software  Sagittal and coronal two dimensional reconstructed images were also submitted for interpretation      IV Contrast: 100 mL of iohexol (OMNIPAQUE)  was administered intravenously without immediate adverse reaction      Rad dose  264 mGy-cm      FINDINGS:     OSSEOUS STRUCTURES:  No acute fracture or dislocation  There is near bone-on-bone apposition of the lateral compartment with large marginal osteophytes and scalloping of the articular surface of the tibial plateau resulting in valgus deformity  There   is moderate    joint space narrowing of the medial compartment with large marginal osteophytes  Mild joint space narrowing of the patellofemoral compartment with large marginal osteophytes  Small suprapatellar joint effusion  Mild chondrocalcinosis  Small calcifications are seen posterior to the medial femoral condyle which may be due to small loose bodies      VISUALIZED MUSCULATURE:  Unremarkable      SOFT TISSUES:  There is a 4 2 x 2 4 x 6 6 cm complex popliteal cyst containing low density cartilaginous debris      OTHER PERTINENT FINDINGS:  None      IMPRESSION:     1  No acute fracture or dislocation  2   Severe osteoarthritis of predominantly the lateral compartment  Small suprapatellar joint effusion    3   4 2 x 2 4 x 6 6 cm complex popliteal cyst         Workstation performed: [id number]          Final Result by Roosevelt Brush MD (46/27 0912)      1  No acute fracture or dislocation  2   Severe osteoarthritis of predominantly the lateral compartment  Small suprapatellar joint effusion  3   4 2 x 2 4 x 6 6 cm complex popliteal cyst          Workstation performed: [id number]                    Procedures  Procedures         ED Course  ED Course as of Aug 31 0748   Mon Aug 31, 2020   0229 Patient with recent ED visit 8/24/2020 for left lower extremity pain and swelling      0232 08/25/2020 with left lower extremity ultrasound performed; no evidence of DVT, or thrombophlebitis      0354 eGFR: 70   0425 Patient currently taking allopurinol     URIC ACID: 4 8   0500 CT lower extremity w contrast left impression -"1   No acute fracture or dislocation  2   Severe osteoarthritis of predominantly the lateral compartment   Small suprapatellar joint effusion  3   4 2 x 2 4 x 6 6 cm complex popliteal cyst "                                                MDM  Number of Diagnoses or Management Options  Osteoarthritis: new and does not require workup     Amount and/or Complexity of Data Reviewed  Clinical lab tests: ordered and reviewed  Tests in the radiology section of CPT®: ordered and reviewed  Review and summarize past medical records: yes    Risk of Complications, Morbidity, and/or Mortality  Presenting problems: moderate  Diagnostic procedures: moderate  Management options: low    Patient Progress  Patient progress: stable    Patient is a 27-year-old female with history bilateral lower extremity edema, hypertension, left leg pain left knee arthroscopically that presents emergency department with persistent dull and achy nonradiating left lower knee pain for 10 days     Patient hemodynamically stable and afebrile  Normal uric acid; patient on allopurinol  Normal CK-MB; normal magnesium  Leukocytosis, no banding, patient afebrile; no pain and proportion to physical exam with septic arthritis not likely  Normal kidney function  CT lower extremity with contrast left-impression-  1  No acute fracture or dislocation  2   Severe osteoarthritis of predominantly the lateral compartment  Small suprapatellar joint effusion  3   4 2 x 2 4 x 6 6 cm complex popliteal cyst "  Delivered Toradol and Percocet in the ED; patient verbalized decrease in left knee pain symptoms status post medication delivery  Counseled patient follow-up with Orthopedics for further evaluation  Delivered patient walker with patient with mechanical understanding of mechanical walker utilization under direct nursing observation  Prescribed Percocet (8 doses) and counseled patient on medication administration and side effects  Follow-up with PCP  Follow up with emergency department symptoms persist or exacerbate  Patient demonstrates verbal understanding of all clinical laboratory and imaging findings, discharge instructions, follow-up and verbalize agreement with current treatment plan  Disposition  Final diagnoses:   Osteoarthritis     Time reflects when diagnosis was documented in both MDM as applicable and the Disposition within this note     Time User Action Codes Description Comment    8/31/2020  5:25 AM Jean Rodas Add [M19 90] Osteoarthritis       ED Disposition     ED Disposition Condition Date/Time Comment    Discharge Stable Mon Aug 31, 2020  5:25 AM Arthur Higginbotham discharge to home/self care              Follow-up Information     Follow up With Specialties Details Why Contact Info Additional [address] [facility] [place] [facility] Call in 3 days for further evaluation of symptoms [address] [phone] [address] [facility] [place], [facility], [address] [place], [place], [address] [place] [address] Call in 1 week for further evaluation of symptoms [address] [phone]  [id number]  , [place], [place], [address] Emergency Department Emergency Medicine Go to  As needed [address]  [phone] [facility], [address], [place], [place], [address]          Discharge Medication List as of 8/31/2020  5:28 AM      START taking these medications    Details   oxyCODONE-acetaminophen (PERCOCET) 5-325 mg per tablet Take 1 tablet by mouth every 6 (six) hours as needed for moderate pain for up to 8 dosesMax Daily Amount: 4 tablets, Starting Mon 8/31/2020, Normal         CONTINUE these medications which have NOT CHANGED    Details   allopurinol (ZYLOPRIM) 100 mg tablet Take 1 tablet (100 mg total) by mouth daily, Starting Tue 8/25/2020, Normal      LISINOPRIL-HYDROCHLOROTHIAZIDE PO Take 1 tablet by mouth daily, Historical Med      metoprolol tartrate (LOPRESSOR) 50 mg tablet Take 1 tablet (50 mg total) by mouth 2 (two) times a day, Starting Mon 5/14/2018, Normal      naproxen sodium (ANAPROX) 550 mg tablet Take 1 tablet (550 mg total) by mouth 2 (two) times a day with meals  , Starting 7/14/2016, Until Discontinued, Print      PRAVASTATIN SODIUM PO Take by mouth daily, Historical Med           No discharge procedures on file      PDMP Review       Value Time User    PDMP Reviewed  Yes 8/31/2020  2:22 AM Angelica Whyte PA-C          ED Provider  Electronically Signed by           Angelica Whyte PA-C  08/31/20 0612